# Patient Record
Sex: FEMALE | Race: WHITE | Employment: FULL TIME | URBAN - METROPOLITAN AREA
[De-identification: names, ages, dates, MRNs, and addresses within clinical notes are randomized per-mention and may not be internally consistent; named-entity substitution may affect disease eponyms.]

---

## 2017-05-30 ENCOUNTER — HOSPITAL ENCOUNTER (OUTPATIENT)
Dept: MAMMOGRAPHY | Facility: HOSPITAL | Age: 47
Discharge: HOME | End: 2017-05-30
Attending: INTERNAL MEDICINE
Payer: COMMERCIAL

## 2017-05-30 DIAGNOSIS — Z12.39 SCREENING BREAST EXAMINATION: ICD-10-CM

## 2017-05-30 PROCEDURE — 77067 SCR MAMMO BI INCL CAD: CPT | Performed by: INTERNAL MEDICINE

## 2017-05-30 PROCEDURE — 77063 BREAST TOMOSYNTHESIS BI: CPT | Performed by: INTERNAL MEDICINE

## 2018-09-22 PROBLEM — R10.9 RIGHT FLANK PAIN: Status: ACTIVE | Noted: 2018-09-22

## 2018-09-24 ENCOUNTER — HOSPITAL ENCOUNTER (OUTPATIENT)
Dept: CT IMAGING | Facility: HOSPITAL | Age: 48
Discharge: HOME | End: 2018-09-24
Attending: INTERNAL MEDICINE
Payer: COMMERCIAL

## 2018-09-24 DIAGNOSIS — R10.31 ABDOMINAL PAIN, RIGHT LOWER QUADRANT: ICD-10-CM

## 2018-09-24 PROCEDURE — 82565 ASSAY OF CREATININE: CPT

## 2018-09-24 PROCEDURE — 74177 CT ABD & PELVIS W/CONTRAST: CPT | Performed by: INTERNAL MEDICINE

## 2018-09-25 LAB — CREAT SERPL-MCNC: 0.7 MG/DL (ref 0.55–1.02)

## 2018-10-06 ENCOUNTER — HOSPITAL ENCOUNTER (OUTPATIENT)
Dept: MAMMOGRAPHY | Age: 48
Discharge: HOME | End: 2018-10-06
Attending: INTERNAL MEDICINE
Payer: COMMERCIAL

## 2018-10-06 DIAGNOSIS — Z12.31 VISIT FOR SCREENING MAMMOGRAM: ICD-10-CM

## 2018-10-06 PROCEDURE — 77063 BREAST TOMOSYNTHESIS BI: CPT | Performed by: INTERNAL MEDICINE

## 2018-10-06 PROCEDURE — 77067 SCR MAMMO BI INCL CAD: CPT | Performed by: INTERNAL MEDICINE

## 2018-10-18 ENCOUNTER — HOSPITAL ENCOUNTER (OUTPATIENT)
Dept: MAMMOGRAPHY | Facility: HOSPITAL | Age: 48
Discharge: HOME | End: 2018-10-18
Attending: INTERNAL MEDICINE
Payer: COMMERCIAL

## 2018-10-18 DIAGNOSIS — R92.2 INCONCLUSIVE MAMMOGRAM: ICD-10-CM

## 2018-10-18 PROCEDURE — 77061 BREAST TOMOSYNTHESIS UNI: CPT | Performed by: INTERNAL MEDICINE

## 2018-10-18 PROCEDURE — 77065 DX MAMMO INCL CAD UNI: CPT | Performed by: INTERNAL MEDICINE

## 2018-11-07 ENCOUNTER — OFFICE VISIT (OUTPATIENT)
Dept: FAMILY MEDICINE CLINIC | Facility: CLINIC | Age: 48
End: 2018-11-07
Payer: COMMERCIAL

## 2018-11-07 VITALS
SYSTOLIC BLOOD PRESSURE: 138 MMHG | HEART RATE: 72 BPM | TEMPERATURE: 98 F | RESPIRATION RATE: 20 BRPM | HEIGHT: 60 IN | DIASTOLIC BLOOD PRESSURE: 86 MMHG | BODY MASS INDEX: 37.3 KG/M2 | OXYGEN SATURATION: 98 % | WEIGHT: 190 LBS

## 2018-11-07 DIAGNOSIS — L01.00 IMPETIGO: Primary | ICD-10-CM

## 2018-11-07 PROCEDURE — 99213 OFFICE O/P EST LOW 20 MIN: CPT | Performed by: NURSE PRACTITIONER

## 2018-11-07 RX ORDER — MUPIROCIN CALCIUM 20 MG/G
1 CREAM TOPICAL 3 TIMES DAILY
Qty: 15 G | Refills: 1 | Status: SHIPPED | OUTPATIENT
Start: 2018-11-07 | End: 2018-11-21

## 2018-11-07 NOTE — PATIENT INSTRUCTIONS
Understanding Impetigo  Impetigo is a common bacterial infection of the skin. It most often affects the face, arms, and legs. But it can appear on any part of the body. Anyone can have it, regardless of age. But it is most common in children.  Impetigo is · To prevent spreading the infection, wash your hands often. Avoid sharing personal items, towels, clothes, pillows, and sheets with others. After each use, wash these items in hot water. · Clean the affected skin several times a day. Don’t scrub.  Instead Many types of bacteria live on normal, healthy skin. The bacteria usually don’t cause problems. Impetigo happens when bacteria enter the skin through a scratch, break, sore, bite, or irritated spot.  They then begin to grow out of control, leading to infect · Clean the affected skin several times a day. Don’t scrub. Instead, soak the area in warm, soapy water. This will help remove the crust that forms.  For places that you can't soak, such as the face, place a clean, warm (not hot) washcloth on the affected a

## 2018-11-07 NOTE — PROGRESS NOTES
CHIEF COMPLAINT:     Patient presents with:  Derm Problem      HPI:   Bing Yu is a 53year old female who presents with complaints of redness ans yellowish discharge to lateral right index finger.   Had an abrasion and redness with slight yellowish EYES: conjunctiva clear, EOM intact, PERRLA  EARS: TM's Pearly grey bilaterally. NOSE: nostrils patent, No exudates, nasal mucosa pink and noninflamed  THROAT: oral mucosa pink, moist. No visible dental caries. Posterior pharynx is nonerythematous.  No exu The goal is to treat the infection and prevent it from spreading to others. · You will likely be given an antibiotic to treat the infection. This may be a cream or ointment called muporicin to put on your skin.  If the infection is severe or spreading, you Impetigo is a common bacterial infection of the skin. It most often affects the face, arms, and legs. But it can appear on any part of the body. Anyone can have it, regardless of age. But it is most common in children. Impetigo is very contagious.  This alvarez · To prevent spreading the infection, wash your hands often. Avoid sharing personal items, towels, clothes, pillows, and sheets with others. After each use, wash these items in hot water. · Clean the affected skin several times a day. Don’t scrub.  Instead

## 2019-04-01 ENCOUNTER — HOSPITAL ENCOUNTER (OUTPATIENT)
Dept: MAMMOGRAPHY | Facility: HOSPITAL | Age: 49
Discharge: HOME | End: 2019-04-01
Attending: INTERNAL MEDICINE
Payer: COMMERCIAL

## 2019-04-01 DIAGNOSIS — R92.8 ABNORMAL MAMMOGRAM: ICD-10-CM

## 2019-04-01 PROCEDURE — 77065 DX MAMMO INCL CAD UNI: CPT | Performed by: INTERNAL MEDICINE

## 2019-04-01 PROCEDURE — 77061 BREAST TOMOSYNTHESIS UNI: CPT | Performed by: INTERNAL MEDICINE

## 2019-04-01 NOTE — IMAGING NOTE
Assisted Dr. Margie Arevalo with recommendation for a left stereotactic-guided breast biopsy for calcifications. Emotional and educational support provided. Written information provided to Brandy Hall.  Pt instructed to stop all blood thinners for 3 days bef

## 2019-04-10 ENCOUNTER — HOSPITAL ENCOUNTER (EMERGENCY)
Facility: HOSPITAL | Age: 49
Discharge: HOME | End: 2019-04-10
Attending: EMERGENCY MEDICINE
Payer: COMMERCIAL

## 2019-04-10 ENCOUNTER — APPOINTMENT (OUTPATIENT)
Dept: GENERAL RADIOLOGY | Facility: HOSPITAL | Age: 49
End: 2019-04-10
Attending: EMERGENCY MEDICINE
Payer: COMMERCIAL

## 2019-04-10 ENCOUNTER — APPOINTMENT (OUTPATIENT)
Dept: CT IMAGING | Facility: HOSPITAL | Age: 49
End: 2019-04-10
Attending: EMERGENCY MEDICINE
Payer: COMMERCIAL

## 2019-04-10 VITALS
HEART RATE: 69 BPM | SYSTOLIC BLOOD PRESSURE: 139 MMHG | RESPIRATION RATE: 14 BRPM | WEIGHT: 184 LBS | HEIGHT: 60 IN | BODY MASS INDEX: 36.12 KG/M2 | TEMPERATURE: 99 F | DIASTOLIC BLOOD PRESSURE: 94 MMHG | OXYGEN SATURATION: 99 %

## 2019-04-10 DIAGNOSIS — R07.89 CHEST WALL PAIN: Primary | ICD-10-CM

## 2019-04-10 PROCEDURE — 99285 EMERGENCY DEPT VISIT HI MDM: CPT

## 2019-04-10 PROCEDURE — 84484 ASSAY OF TROPONIN QUANT: CPT

## 2019-04-10 PROCEDURE — 84484 ASSAY OF TROPONIN QUANT: CPT | Performed by: EMERGENCY MEDICINE

## 2019-04-10 PROCEDURE — 93010 ELECTROCARDIOGRAM REPORT: CPT

## 2019-04-10 PROCEDURE — 85025 COMPLETE CBC W/AUTO DIFF WBC: CPT | Performed by: EMERGENCY MEDICINE

## 2019-04-10 PROCEDURE — 36415 COLL VENOUS BLD VENIPUNCTURE: CPT

## 2019-04-10 PROCEDURE — 71275 CT ANGIOGRAPHY CHEST: CPT | Performed by: EMERGENCY MEDICINE

## 2019-04-10 PROCEDURE — 81025 URINE PREGNANCY TEST: CPT

## 2019-04-10 PROCEDURE — 85025 COMPLETE CBC W/AUTO DIFF WBC: CPT

## 2019-04-10 PROCEDURE — 85378 FIBRIN DEGRADE SEMIQUANT: CPT | Performed by: EMERGENCY MEDICINE

## 2019-04-10 PROCEDURE — 80053 COMPREHEN METABOLIC PANEL: CPT

## 2019-04-10 PROCEDURE — 71045 X-RAY EXAM CHEST 1 VIEW: CPT | Performed by: EMERGENCY MEDICINE

## 2019-04-10 PROCEDURE — 80053 COMPREHEN METABOLIC PANEL: CPT | Performed by: EMERGENCY MEDICINE

## 2019-04-10 PROCEDURE — 93005 ELECTROCARDIOGRAM TRACING: CPT

## 2019-04-10 NOTE — ED INITIAL ASSESSMENT (HPI)
Pt here with chest pain which started last night. States she has history of \"murmur\". Has seen primary care for right lower abdominal pain which was worked up weeks ago, but is still present and very severe at times.

## 2019-04-10 NOTE — ED PROVIDER NOTES
Patient Seen in: [facility] Emergency Department    History   Patient presents with:  Chest Pain Angina (cardiovascular)    Stated Complaint: chest pain    HPI    50-year-old male here concerned about chest pain that she has had on the left side.   I regular rhythm. Pulmonary/Chest: Effort normal and breath sounds normal. No stridor. Abdominal: Soft. There is no tenderness. There is no guarding. Musculoskeletal: Exhibits no edema or tenderness.    Neurological: Pt is alert and oriented to person, results for these tests on the individual orders. POCT PREGNANCY, URINE   RAINBOW DRAW BLUE   RAINBOW DRAW LAVENDER   RAINBOW DRAW LIGHT GREEN   RAINBOW DRAW GOLD   CBC W/ DIFFERENTIAL     EKG    Rate, intervals and axes as noted on EKG Report.   Rate: 73 at 10:22     Approved by: Mari Werner MD                MDM   Negative troponin, reassuring EKG, negative CTA chest.  Given findings today, doubt serious etiology of her pain. Continues to to Medicare, follow-up with PCP.   Return precautions discussed

## 2019-04-22 ENCOUNTER — HOSPITAL ENCOUNTER (OUTPATIENT)
Dept: MAMMOGRAPHY | Facility: HOSPITAL | Age: 49
Discharge: HOME | End: 2019-04-22
Attending: INTERNAL MEDICINE
Payer: COMMERCIAL

## 2019-04-22 DIAGNOSIS — R92.8 ABNORMAL MAMMOGRAM: ICD-10-CM

## 2019-04-22 PROCEDURE — 88305 TISSUE EXAM BY PATHOLOGIST: CPT | Performed by: INTERNAL MEDICINE

## 2019-04-22 PROCEDURE — 19081 BX BREAST 1ST LESION STRTCTC: CPT | Performed by: INTERNAL MEDICINE

## 2019-04-24 ENCOUNTER — TELEPHONE (OUTPATIENT)
Dept: MAMMOGRAPHY | Facility: HOSPITAL | Age: 49
End: 2019-04-24

## 2019-04-24 NOTE — TELEPHONE ENCOUNTER
Verified pt's name and . Spoke with patient regarding preliminary negative breast biopsy results. Biopsy site is healing well. Hematoma management discussed.   Radiologist recommendation is to get a Diagnostic mammogram in 6 months to document stabilit

## 2019-11-07 ENCOUNTER — HOSPITAL ENCOUNTER (OUTPATIENT)
Dept: MAMMOGRAPHY | Facility: HOSPITAL | Age: 49
Discharge: HOME | End: 2019-11-07
Attending: INTERNAL MEDICINE
Payer: COMMERCIAL

## 2019-11-07 DIAGNOSIS — R92.8 ABNORMAL MAMMOGRAM: ICD-10-CM

## 2019-11-07 PROCEDURE — 77061 BREAST TOMOSYNTHESIS UNI: CPT | Performed by: INTERNAL MEDICINE

## 2019-11-07 PROCEDURE — 77065 DX MAMMO INCL CAD UNI: CPT | Performed by: INTERNAL MEDICINE

## 2020-11-04 ENCOUNTER — HOSPITAL ENCOUNTER (OUTPATIENT)
Dept: MAMMOGRAPHY | Age: 50
Discharge: HOME | End: 2020-11-04
Attending: INTERNAL MEDICINE
Payer: COMMERCIAL

## 2020-11-04 DIAGNOSIS — Z00.00 ROUTINE GENERAL MEDICAL EXAMINATION AT A HEALTH CARE FACILITY: ICD-10-CM

## 2020-11-04 DIAGNOSIS — Z12.31 ENCOUNTER FOR SCREENING MAMMOGRAM FOR MALIGNANT NEOPLASM OF BREAST: ICD-10-CM

## 2020-11-04 PROCEDURE — 77067 SCR MAMMO BI INCL CAD: CPT | Performed by: INTERNAL MEDICINE

## 2020-11-04 PROCEDURE — 77063 BREAST TOMOSYNTHESIS BI: CPT | Performed by: INTERNAL MEDICINE

## 2020-11-13 ENCOUNTER — HOSPITAL ENCOUNTER (OUTPATIENT)
Dept: MAMMOGRAPHY | Facility: HOSPITAL | Age: 50
Discharge: HOME | End: 2020-11-13
Attending: INTERNAL MEDICINE
Payer: COMMERCIAL

## 2020-11-13 DIAGNOSIS — R92.2 INCONCLUSIVE MAMMOGRAM: ICD-10-CM

## 2020-11-13 PROCEDURE — 76641 ULTRASOUND BREAST COMPLETE: CPT | Performed by: INTERNAL MEDICINE

## 2021-11-08 ENCOUNTER — HOSPITAL ENCOUNTER (OUTPATIENT)
Dept: MAMMOGRAPHY | Age: 51
Discharge: HOME | End: 2021-11-08
Attending: INTERNAL MEDICINE
Payer: COMMERCIAL

## 2021-11-08 DIAGNOSIS — Z12.31 ENCOUNTER FOR SCREENING MAMMOGRAM FOR MALIGNANT NEOPLASM OF BREAST: ICD-10-CM

## 2021-12-10 ENCOUNTER — HOSPITAL ENCOUNTER (OUTPATIENT)
Dept: MAMMOGRAPHY | Age: 51
Discharge: HOME | End: 2021-12-10
Attending: INTERNAL MEDICINE
Payer: COMMERCIAL

## 2021-12-10 DIAGNOSIS — Z12.31 ENCOUNTER FOR SCREENING MAMMOGRAM FOR MALIGNANT NEOPLASM OF BREAST: ICD-10-CM

## 2021-12-10 PROCEDURE — 77067 SCR MAMMO BI INCL CAD: CPT | Performed by: INTERNAL MEDICINE

## 2021-12-10 PROCEDURE — 77063 BREAST TOMOSYNTHESIS BI: CPT | Performed by: INTERNAL MEDICINE

## 2024-01-17 ENCOUNTER — HOSPITAL ENCOUNTER (OUTPATIENT)
Dept: GENERAL RADIOLOGY | Age: 54
Discharge: HOME | End: 2024-01-17
Attending: INTERNAL MEDICINE
Payer: COMMERCIAL

## 2024-01-17 DIAGNOSIS — M25.561 RIGHT KNEE PAIN, UNSPECIFIED CHRONICITY: ICD-10-CM

## 2024-01-17 PROCEDURE — 73562 X-RAY EXAM OF KNEE 3: CPT | Performed by: INTERNAL MEDICINE

## 2024-01-29 ENCOUNTER — HOSPITAL ENCOUNTER (OUTPATIENT)
Dept: MAMMOGRAPHY | Age: 54
Discharge: HOME | End: 2024-01-29
Attending: INTERNAL MEDICINE
Payer: COMMERCIAL

## 2024-01-29 DIAGNOSIS — Z12.31 VISIT FOR SCREENING MAMMOGRAM: ICD-10-CM

## 2024-01-29 PROCEDURE — 77063 BREAST TOMOSYNTHESIS BI: CPT | Performed by: INTERNAL MEDICINE

## 2024-01-29 PROCEDURE — 77067 SCR MAMMO BI INCL CAD: CPT | Performed by: INTERNAL MEDICINE

## 2024-02-20 ENCOUNTER — HOSPITAL ENCOUNTER (OUTPATIENT)
Dept: GENERAL RADIOLOGY | Age: 54
Discharge: HOME | End: 2024-02-20
Payer: COMMERCIAL

## 2024-02-20 ENCOUNTER — LAB ENCOUNTER (OUTPATIENT)
Dept: LAB | Age: 54
End: 2024-02-20
Payer: COMMERCIAL

## 2024-02-20 ENCOUNTER — OFFICE VISIT (OUTPATIENT)
Dept: RHEUMATOLOGY | Facility: CLINIC | Age: 54
End: 2024-02-20
Payer: COMMERCIAL

## 2024-02-20 VITALS — WEIGHT: 213 LBS | BODY MASS INDEX: 41.82 KG/M2 | HEIGHT: 60 IN

## 2024-02-20 DIAGNOSIS — M25.50 POLYARTHRALGIA: ICD-10-CM

## 2024-02-20 DIAGNOSIS — R76.8 ANA POSITIVE: ICD-10-CM

## 2024-02-20 DIAGNOSIS — R76.8 ANA POSITIVE: Primary | ICD-10-CM

## 2024-02-20 DIAGNOSIS — M17.0 OSTEOARTHRITIS OF BOTH KNEES, UNSPECIFIED OSTEOARTHRITIS TYPE: ICD-10-CM

## 2024-02-20 LAB
C3 SERPL-MCNC: 173.3 MG/DL (ref 90–170)
C4 SERPL-MCNC: 37.8 MG/DL (ref 12–36)
CRP SERPL-MCNC: 1.9 MG/DL (ref ?–1)
ERYTHROCYTE [SEDIMENTATION RATE] IN BLOOD: 47 MM/HR
HAV IGM SER QL: NONREACTIVE
HBV CORE IGM SER QL: NONREACTIVE
HBV SURFACE AG SERPL QL IA: NONREACTIVE
HCV AB SERPL QL IA: NONREACTIVE

## 2024-02-20 PROCEDURE — 86235 NUCLEAR ANTIGEN ANTIBODY: CPT | Performed by: INTERNAL MEDICINE

## 2024-02-20 PROCEDURE — 86225 DNA ANTIBODY NATIVE: CPT

## 2024-02-20 PROCEDURE — 86039 ANTINUCLEAR ANTIBODIES (ANA): CPT | Performed by: INTERNAL MEDICINE

## 2024-02-20 PROCEDURE — 72110 X-RAY EXAM L-2 SPINE 4/>VWS: CPT | Performed by: INTERNAL MEDICINE

## 2024-02-20 PROCEDURE — 72202 X-RAY EXAM SI JOINTS 3/> VWS: CPT | Performed by: INTERNAL MEDICINE

## 2024-02-20 PROCEDURE — 86480 TB TEST CELL IMMUN MEASURE: CPT

## 2024-02-20 PROCEDURE — 73562 X-RAY EXAM OF KNEE 3: CPT | Performed by: INTERNAL MEDICINE

## 2024-02-20 PROCEDURE — 36415 COLL VENOUS BLD VENIPUNCTURE: CPT

## 2024-02-20 PROCEDURE — 73130 X-RAY EXAM OF HAND: CPT | Performed by: INTERNAL MEDICINE

## 2024-02-20 PROCEDURE — 3008F BODY MASS INDEX DOCD: CPT | Performed by: INTERNAL MEDICINE

## 2024-02-20 PROCEDURE — 80074 ACUTE HEPATITIS PANEL: CPT

## 2024-02-20 PROCEDURE — 86160 COMPLEMENT ANTIGEN: CPT

## 2024-02-20 PROCEDURE — 20610 DRAIN/INJ JOINT/BURSA W/O US: CPT | Performed by: INTERNAL MEDICINE

## 2024-02-20 PROCEDURE — 85652 RBC SED RATE AUTOMATED: CPT

## 2024-02-20 PROCEDURE — 99245 OFF/OP CONSLTJ NEW/EST HI 55: CPT | Performed by: INTERNAL MEDICINE

## 2024-02-20 PROCEDURE — 86038 ANTINUCLEAR ANTIBODIES: CPT | Performed by: INTERNAL MEDICINE

## 2024-02-20 PROCEDURE — 86140 C-REACTIVE PROTEIN: CPT

## 2024-02-20 PROCEDURE — 86225 DNA ANTIBODY NATIVE: CPT | Performed by: INTERNAL MEDICINE

## 2024-02-20 RX ORDER — TRIAMCINOLONE ACETONIDE 40 MG/ML
40 INJECTION, SUSPENSION INTRA-ARTICULAR; INTRAMUSCULAR ONCE
Status: COMPLETED | OUTPATIENT
Start: 2024-02-20 | End: 2024-02-20

## 2024-02-21 LAB — DSDNA IGG SERPL IA-ACNC: 2.4 IU/ML

## 2024-02-22 LAB
M TB IFN-G CD4+ T-CELLS BLD-ACNC: 0 IU/ML
M TB TUBERC IFN-G BLD QL: NEGATIVE
M TB TUBERC IGNF/MITOGEN IGNF CONTROL: >10 IU/ML
NUCLEAR IGG TITR SER IF: POSITIVE {TITER}
QFT TB1 AG MINUS NIL: 0 IU/ML
QFT TB2 AG MINUS NIL: 0 IU/ML

## 2024-02-23 LAB
ANA NUCLEOLAR TITR SER IF: 5120 {TITER}
DSDNA AB TITR SER: <10 {TITER}

## 2024-02-24 LAB
CENTROMERE IGG SER-ACNC: <0.4 U/ML
ENA JO1 AB SER IA-ACNC: <0.3 U/ML
ENA RNP IGG SER IA-ACNC: 0.9 U/ML
ENA SCL70 IGG SER IA-ACNC: <0.6 U/ML
ENA SM IGG SER IA-ACNC: <0.7 U/ML
ENA SS-A IGG SER IA-ACNC: <0.4 U/ML
ENA SS-B IGG SER IA-ACNC: <0.4 U/ML
U1 SNRNP IGG SER IA-ACNC: 0.8 U/ML

## 2024-03-13 ENCOUNTER — PATIENT MESSAGE (OUTPATIENT)
Dept: RHEUMATOLOGY | Facility: CLINIC | Age: 54
End: 2024-03-13

## 2024-03-13 NOTE — TELEPHONE ENCOUNTER
From: Ny Camacho  To: Abdelrahman Nevarez  Sent: 3/13/2024 5:45 AM CDT  Subject: Pain    Hi Dr. Nevarez     I got some relief from the cortisone shots, but the pain is back and affecting my walk. Even muscle discomfort from me adjusting my gait because of knee pain. I know our next appointment is in a couple weeks, just wanted to let you know that any relief I was given by the shots has worn off.    Thank you,  Ny

## 2024-03-18 ENCOUNTER — TELEPHONE (OUTPATIENT)
Dept: RHEUMATOLOGY | Facility: CLINIC | Age: 54
End: 2024-03-18

## 2024-03-18 RX ORDER — PREDNISONE 5 MG/1
TABLET ORAL
Qty: 77 TABLET | Refills: 0 | Status: SHIPPED | OUTPATIENT
Start: 2024-03-18 | End: 2024-05-15

## 2024-03-20 NOTE — TELEPHONE ENCOUNTER
Hello-that is great news.  Perhaps, we can do the Synvisc injection during her appointment with me on 3/26.

## 2024-03-20 NOTE — TELEPHONE ENCOUNTER
03/20/2024  07:15 AM  BV result  Posted by: Summer    Complete BV    NO Action required: Synvisc-one is eligible under primary medical benefit.Covered @ 100% after Co-pay under Medical.Medication Can be obtained via physician Buy and Bill.Pre-authorization is not required.Please review the attached Benefit Verification Results form for more details.MT*O

## 2024-03-26 ENCOUNTER — OFFICE VISIT (OUTPATIENT)
Dept: RHEUMATOLOGY | Facility: CLINIC | Age: 54
End: 2024-03-26

## 2024-03-26 VITALS — BODY MASS INDEX: 42 KG/M2 | HEIGHT: 60 IN

## 2024-03-26 DIAGNOSIS — Z79.899 ENCOUNTER FOR LONG-TERM (CURRENT) USE OF HIGH-RISK MEDICATION: ICD-10-CM

## 2024-03-26 DIAGNOSIS — M06.00 SERONEGATIVE RHEUMATOID ARTHRITIS (HCC): Primary | ICD-10-CM

## 2024-03-26 DIAGNOSIS — M17.0 OSTEOARTHRITIS OF BOTH KNEES, UNSPECIFIED OSTEOARTHRITIS TYPE: ICD-10-CM

## 2024-03-26 DIAGNOSIS — R76.8 ANA POSITIVE: ICD-10-CM

## 2024-03-26 PROCEDURE — 99215 OFFICE O/P EST HI 40 MIN: CPT | Performed by: INTERNAL MEDICINE

## 2024-03-26 PROCEDURE — 20610 DRAIN/INJ JOINT/BURSA W/O US: CPT | Performed by: INTERNAL MEDICINE

## 2024-03-26 RX ORDER — HYDROXYCHLOROQUINE SULFATE 200 MG/1
400 TABLET, FILM COATED ORAL DAILY
Qty: 180 TABLET | Refills: 2 | Status: SHIPPED | OUTPATIENT
Start: 2024-03-26

## 2024-03-26 NOTE — PROGRESS NOTES
RHEUMATOLOGY CLINIC- FOLLOW UP    Ny Camacho is a 53 year old female.    ASSESSMENT/PLAN:       ICD-10-CM    1. Seronegative rheumatoid arthritis (HCC)  M06.00 hylan G-F 20 (Synvisc-One) 48 MG/6ML intra-articular injection 48 mg     hylan G-F 20 (Synvisc-One) 48 MG/6ML intra-articular injection 48 mg     CBC With Differential With Platelet     C-Reactive Protein     Sed Rate, Westergren (Automated)     ALT(SGPT)     AST (SGOT)     Creatinine, Serum      2. MISTY positive  R76.8 CBC With Differential With Platelet     C-Reactive Protein     Sed Rate, Westergren (Automated)     ALT(SGPT)     AST (SGOT)     Creatinine, Serum    2/20/24: MISTY 1: 5120 homogenous with reflex antibodies negative,dsDNA by IFA <10, dsDNA 2.4 WNL, C4 37.8, C3 173.3 , ESR 47 , CRP 1.9      3. Osteoarthritis of both knees, unspecified osteoarthritis type  M17.0 hylan G-F 20 (Synvisc-One) 48 MG/6ML intra-articular injection 48 mg     hylan G-F 20 (Synvisc-One) 48 MG/6ML intra-articular injection 48 mg     DRAIN/INJECT LARGE JOINT/BURSA     DRAIN/INJECT LARGE JOINT/BURSA      4. Encounter for long-term (current) use of high-risk medication  Z79.899 CBC With Differential With Platelet     C-Reactive Protein     Sed Rate, Westergren (Automated)     ALT(SGPT)     AST (SGOT)     Creatinine, Serum          DISCUSSION:  Patient presents for follow-up after originally presenting as a new referral for evaluation of polyarthralgias in setting of significantly positive MISTY and elevated inflammatory markers.  She noted significant response on a p.o. prednisone taper.  Overall, patient's presentation is consistent with an underlying inflammatory throughout the; specifically, seronegative rheumatoid arthritis.  Therefore, discussed with patient risk/benefits of hydroxychloroquine to which patient is agreeable.  Additionally, given only minimal improvement from bilateral knee CSI's, Synvisc injections performed in office today without  complication.      PLAN:  -Start hydroxychloroquine 400 mg daily (less than 5 mg/kg)       -Discussed with patient side effects of Plaquenil which include but are not limited to retinopathy skin changes, blood abnormalities, hepatotoxicity, cardiotoxicity, neuro changes such as dizziness, fatigue, irritability, myopathy.  Pending patient response, patient will require annual Plaquenil eye exams if continued on chronic Plaquenil.        -Patient has an upcoming visit with her ophthalmologist.  Discussed with patient to let her ophthalmologist know about her treatment with hydroxychloroquine.  - Patient currently on p.o. prednisone 10 mg daily from the 20 mg taper.  Discussed with patient, after completing the taper to let our office know and we will send a prescription for p.o. prednisone 5 mg as needed until follow-up.  - Consult/evaluation communicated with referring physician/provider.    PROCEDURE: Bilateral Knee Synvisc injections   INDICATION: Bilateral Knee Pain with Bilateral Knee Osteoarthritis     Procedure:After sterile prep with Povidone-Iodine and alcohol and following ethyl chloride spray the LEFT knee was injected with Xylocaine 1% x 1 cc in the medial midpatellar line under sterile technique. Then knee was then injected with the contents of a Synvisc syringe followed by 1 cc of Xylocaine 1%. The needle was withdrawn and hemostasis was achieved with pressure. Band-Aid dressings were applied. Pt tolerated the procedure well.      After sterile prep with Povidone-Iodine and alcohol and following ethyl chloride spray the RIGHT knee was injected with Xylocaine 1% x 1 cc in the medial midpatellar line under sterile technique. Then knee was then injected with the contents of a Synvisc syringe followed by 1 cc of Xylocaine 1%. The needle was withdrawn and hemostasis was achieved with pressure. Band-Aid dressings were applied. Pt tolerated the procedure well.      Patient was instructed to limit ambulation for  the remainder of the day and apply ice to the joint 30 minutes per hour for the next 3 hours and then as needed thereafter. Pt. will call the office if there is increased warmth tenderness swelling bleeding bruising or drainage. Otherwise will return for next injection in 1 week/ will be eligible for repeat series in 6 months.        Abdelrahman Nevarez DO  3/26/2024   10:41 AM      Patient to follow-up in 3 months with repeat labs drawn prior.    Patient has 1 acute or chronic illness that poses a threat to life or bodily function.  I performed an independent interpretation of tests completed by another healthcare professional and am discussing management and test interpretation with and external healthcare professional.    Discussed with patient that they are on chronic treatment with a high-risk medication that requires close lab monitoring for possible drug toxicity.  Additionally, discussed with patient give the possible immunosuppressive effects of their medication as well as their underlying hyper-inflammatory state, the importance of keeping vaccinations and age-appropriate screenings up-to-date, given increased risk of complications and malignancies seen in these patient populations.  Patient to f/u with repeat labs drawn prior (standing labs ordered).  Last QuantiFERON TB testin24  Last Hepatitis testin24         Subjective:     3/26/24 Follow-Up: Patient feeling significantly improved since starting the p.o. prednisone taper, now able to complete her ADLs and was even gardening yesterday.  She is currently on 10 mg daily.  No longer taking Celebrex.  Noted minimal improvement with her previous bilateral knee CSI's that only lasted for about 10 days.      Current Medications:  PO 10 mg every day- starting at a  PO Prednisone taper 20 mg every day on 24    Medication History:  Patient has not been on biologic/DMARD  Bilateral knee CSI's over 10 years ago-ineffective  Bilateral Knee CSI  2/20/24  Celebrex 200 mg every day- no improvement    Interval History:     2/20/24 Initial Consult:   I had the pleasure of seeing Ny Camacho on 2/20/2024 as a new outpatient consultation for positive MISTY. The patient was originally referred by her PCP, Dr Wil Reese.     53 year old female w/ PMH HTN, chondromalacia of the patella, impetigo who presents to clinic today.  Patient reports polyarthralgias affecting her hips, low back, hands, and knees.  Predominantly, however, patient especially notes significant knee pain that can interrupt her sleep described as a \"dull, aching \"pain.  All of her arthralgias are worse at the end of the day and worse with activity such as weightbearing activities like climbing the stairs.  No significant a.m. stiffness.  She has been attempting Celebrex 200 mg daily without much improvement of her symptoms.  ROS otherwise negative for fever, chills, unintentional weight loss, Raynaud's phenomenon, sicca symptoms, photosensitivity/rash, changes to urination such as hematuria/frothiness.  Family history notable for a mother with debilitating arthritis, however, unknown whether inflammatory versus degenerative.  HISTORY:  Past Medical History:   Diagnosis Date    Essential hypertension     Obesity, unspecified 2012    lap band    Painful knee approx date 2011    left knee pain no cartilage bones rubbing together      Social Hx Reviewed   Family Hx Reviewed     Medications (Active prior to today's visit):  Current Outpatient Medications   Medication Sig Dispense Refill    predniSONE 5 MG Oral Tab Take 4 tablets (20 mg total) by mouth daily for 3 days, THEN 3 tablets (15 mg total) daily for 3 days, THEN 2 tablets (10 mg total) daily for 3 days, THEN 1 tablet (5 mg total) daily. Take 4 tablets (20 mg total) by mouth every morning with food for 3 days, THEN 3 tablets (15 mg total) every morning with food for 3 days, THEN 2 tablets (10 mg total) every morning with food for 3 days.  Then, continue 1 tablet every morning with food (5 mg total) until follow-up.. 77 tablet 0    Multiple Vitamins-Minerals (MULTIPLE VITAMINS/WOMENS OR) Take by mouth.      celecoxib (CELEBREX) 200 MG Oral Cap Take 1 capsule (200 mg total) by mouth daily. (Patient not taking: Reported on 2/20/2024) 90 capsule 0    losartan-hydroCHLOROthiazide 100-25 MG Oral Tab Take 1 tablet by mouth daily. 90 tablet 0    Cholecalciferol (VITAMIN D-1000 MAX ST) 25 MCG (1000 UT) Oral Tab Take 1 tablet (1,000 Units total) by mouth daily.  0       Allergies:  Allergies   Allergen Reactions    Asa [Aspirin] SWELLING     Tongue and lip swelling    Other      Bee Sting childhood unsure of reaction    Penicillins SWELLING     Tongue and lip swelling         ROS:   Review of Systems   Constitutional:  Negative for chills and fever.   HENT:  Negative for congestion, hearing loss, mouth sores, nosebleeds and trouble swallowing.    Eyes:  Negative for photophobia, pain, redness and visual disturbance.   Respiratory:  Negative for cough and shortness of breath.    Cardiovascular:  Negative for chest pain, palpitations and leg swelling.   Gastrointestinal:  Negative for abdominal pain, blood in stool, diarrhea and nausea.   Endocrine: Negative for cold intolerance and heat intolerance.   Genitourinary:  Negative for dysuria, frequency and hematuria.   Musculoskeletal:  Positive for arthralgias, back pain and gait problem. Negative for joint swelling, myalgias, neck pain and neck stiffness.   Skin:  Negative for color change and rash.   Neurological:  Negative for dizziness, weakness, numbness and headaches.   Psychiatric/Behavioral:  Negative for confusion and dysphoric mood.         PHYSICAL EXAM:     Constitutional:  Well developed, Well nourished, No acute distress  HENT:  Normocephalic, Atraumatic, Bilateral external ears normal, Oropharynx moist, No oral exudates.  Neck: Normal range of motion, No tenderness, Supple, No stridor.  Eyes:   PERRL, EOMI, Conjunctiva normal, No discharge.  Respiratory:  Normal breath sounds, No respiratory distress, No wheezing.  Cardiovascular:  Normal heart rate, Normal rhythm, No murmurs, No rubs, No gallops.  GI:  Bowel sounds normal, Soft, No tenderness, No masses, No pulsatile masses.  : No CVA tenderness.  Musculoskeletal: A comprehensive 28 count joint exam was done and was negative for swelling or tenderness except as noted. Inspections for misalignment, asymmetry, crepitation, defects, tenderness, masses, nodules, effusions, range of motion, and stability in the upper and lower extremities bilaterally are all normal unless noted.           Integument:  Warm, Dry, No erythema, No rash.  Lymphatic:  No lymphadenopathy noted.  Neurologic:  Alert & oriented x 3, Normal motor function, Normal sensory function, No focal deficits noted.  Psychiatric:  Affect normal, Judgment normal, Mood normal.    LABS:   Prior lab work reviewed and notable for:    24:  MISTY 1: 5120 homogenous with reflex antibodies negative  dsDNA by IFA less than 10 WNL, dsDNA 2.4 WNL  C4 37.8 (high), C3 173.3 (high)  ESR 47 (high), CRP 1.9 (high)    TB testing and acute hepatitis panel negative    2024:  CMP with normal renal function, normal LFTs, no gamma gap  CBC without cytopenias and no leukocytosis  TSH 4.04 WNL    MISTY screen by IFA 1: 1280, nuclear/homogenous  RF less than 14, CCP less than 16  CRP 21.8 (high), ESR 33 (high)    Imagin2024 bilateral hand XR's:    L Hand Impression   CONCLUSION:  1. No acute fracture or dislocation.  2. Moderate arthritic changes involve the 1st carpal-metacarpal joint.  Mild degeneration MCP and interphalangeal joints, with marked degeneration at the DIP joints 3rd and 4th digits.            R Hand Impression   CONCLUSION:  1. No acute fracture or dislocation.  2. Osteoarthritic changes involving the wrist and hand.     2024 left knee XR:    Impression   CONCLUSION:  1. No acute  fracture or dislocation.  2. Moderate tricompartment osteoarthritis.  Mild joint effusion.       2/20/2024 lumbar spine XR:  Impression   CONCLUSION:  1. Chronic anterior spondylolisthesis L4 relative to L5.  2. S-shaped scoliosis and multilevel spondylosis.  3. No acute fracture or acute malalignment.       2/20/2024 SI joint XR:  Impression   CONCLUSION:  1. No acute fracture dislocation.  2. Mild bilateral SI joint degenerative changes, left greater than right.          1/17/2024 right knee XR:    Impression   CONCLUSION:       Negative for fracture or malalignment.  Moderate joint space loss of the medial compartment with tricompartmental osteophyte formation.  No patellar subluxation.  No joint effusion or focal soft tissue swelling.

## 2024-04-09 ENCOUNTER — TELEPHONE (OUTPATIENT)
Dept: RHEUMATOLOGY | Facility: CLINIC | Age: 54
End: 2024-04-09

## 2024-04-09 NOTE — TELEPHONE ENCOUNTER
Returned patient's call 4/9 during the afternoon.  Patient started hydroxychloroquine 400 mg about 1 week prior.  However, started noting GI upset a week after.  Additionally, her GI symptoms just started recently about 12 hours after her morning doses of hydroxychloroquine.  I suspect her GI upset is separate from the hydroxychloroquine, however, discussed with patient to divide up her hydroxychloroquine doses to help increase tolerability in case.  Patient to monitor symptoms for now.

## 2024-04-09 NOTE — TELEPHONE ENCOUNTER
Patient has recently started taking hydroxychloroquine. Patient is taking medication with food as instructed by Dr. Nevarez. Patient is extremely nauseas. Taking medication with food is not helping. Patient takes medication at 5am and starts getting sick 12-14 hours later. Patient is also experiencing diarrhea.   Patient is not sure if her symptoms are normal side effects of medication or if she is getting sick. Please advise

## 2024-05-17 RX ORDER — PREDNISONE 5 MG/1
5 TABLET ORAL DAILY PRN
Qty: 30 TABLET | Refills: 2 | Status: SHIPPED | OUTPATIENT
Start: 2024-05-17

## 2024-05-17 NOTE — TELEPHONE ENCOUNTER
Requested Prescriptions     Pending Prescriptions Disp Refills    PREDNISONE 5 MG Oral Tab [Pharmacy Med Name: PREDNISONE 5MG TABLETS] 77 tablet 0     Si TABLETS DAILY FOR 3 DAYS, 3 TABLETS DAILY FOR 3 DAYS, 2 TABLETS DAILY FOR 3 DAYS, THEN TAKE 1 TABLET DAILY UNTIL FOLLOW UP. TAKE WITH FOOD     Future Appointments   Date Time Provider Department Center   2024 10:00 AM Lehne, Ramier, DO [facility] [place]     LOV: 3/26/24   Last Refilled:3/18/24 #[id number]      PLAN:  -Start hydroxychloroquine 400 mg daily (less than 5 mg/kg)       -Discussed with patient side effects of Plaquenil which include but are not limited to retinopathy skin changes, blood abnormalities, hepatotoxicity, cardiotoxicity, neuro changes such as dizziness, fatigue, irritability, myopathy.  Pending patient response, patient will require annual Plaquenil eye exams if continued on chronic Plaquenil.        -Patient has an upcoming visit with her ophthalmologist.  Discussed with patient to let her ophthalmologist know about her treatment with hydroxychloroquine.  - Patient currently on p.o. prednisone 10 mg daily from the 20 mg taper.  Discussed with patient, after completing the taper to let our office know and we will send a prescription for p.o. prednisone 5 mg as needed until follow-up.  - Consult/evaluation communicated with referring physician/provider.

## 2024-06-28 ENCOUNTER — OFFICE VISIT (OUTPATIENT)
Dept: RHEUMATOLOGY | Facility: CLINIC | Age: 54
End: 2024-06-28

## 2024-06-28 VITALS
BODY MASS INDEX: 36.52 KG/M2 | DIASTOLIC BLOOD PRESSURE: 70 MMHG | HEART RATE: 72 BPM | SYSTOLIC BLOOD PRESSURE: 120 MMHG | HEIGHT: 60 IN | WEIGHT: 186 LBS

## 2024-06-28 DIAGNOSIS — M17.0 OSTEOARTHRITIS OF BOTH KNEES, UNSPECIFIED OSTEOARTHRITIS TYPE: ICD-10-CM

## 2024-06-28 DIAGNOSIS — M06.00 SERONEGATIVE RHEUMATOID ARTHRITIS (HCC): Primary | ICD-10-CM

## 2024-06-28 DIAGNOSIS — R76.8 ANA POSITIVE: ICD-10-CM

## 2024-06-28 DIAGNOSIS — Z79.899 ENCOUNTER FOR LONG-TERM (CURRENT) USE OF HIGH-RISK MEDICATION: ICD-10-CM

## 2024-06-28 PROCEDURE — G2211 COMPLEX E/M VISIT ADD ON: HCPCS | Performed by: INTERNAL MEDICINE

## 2024-06-28 PROCEDURE — 99214 OFFICE O/P EST MOD 30 MIN: CPT | Performed by: INTERNAL MEDICINE

## 2024-06-28 PROCEDURE — 3078F DIAST BP <80 MM HG: CPT | Performed by: INTERNAL MEDICINE

## 2024-06-28 PROCEDURE — 3074F SYST BP LT 130 MM HG: CPT | Performed by: INTERNAL MEDICINE

## 2024-06-28 PROCEDURE — 3008F BODY MASS INDEX DOCD: CPT | Performed by: INTERNAL MEDICINE

## 2024-06-28 RX ORDER — CELECOXIB 200 MG/1
200 CAPSULE ORAL 2 TIMES DAILY PRN
Qty: 90 CAPSULE | Refills: 2 | Status: SHIPPED | OUTPATIENT
Start: 2024-06-28 | End: 2024-06-28

## 2024-06-28 RX ORDER — GABAPENTIN 100 MG/1
CAPSULE ORAL
Qty: 90 CAPSULE | Refills: 1 | Status: SHIPPED | OUTPATIENT
Start: 2024-06-28

## 2024-06-28 RX ORDER — CELECOXIB 100 MG/1
100 CAPSULE ORAL 2 TIMES DAILY PRN
Qty: 60 CAPSULE | Refills: 2 | Status: SHIPPED | OUTPATIENT
Start: 2024-06-28

## 2024-06-28 NOTE — PROGRESS NOTES
RHEUMATOLOGY CLINIC- FOLLOW UP    Ny Camacho is a 53 year old female.    ASSESSMENT/PLAN:       ICD-10-CM    1. Seronegative rheumatoid arthritis (HCC)  M06.00       2. MISTY positive  R76.8     2/20/24: MISTY 1: 5120 homogenous with reflex antibodies negative,dsDNA by IFA <10, dsDNA 2.4 WNL, C4 37.8, C3 173.3 , ESR 47 , CRP 1.9      3. Osteoarthritis of both knees, unspecified osteoarthritis type  M17.0       4. Encounter for long-term (current) use of high-risk medication  Z79.899           DISCUSSION:  Patient presents for follow-up of seronegative rheumatoid arthritis (positive MISTY).  Overall, patient's presentation is consistent with an underlying inflammatory throughout the; specifically, seronegative rheumatoid arthritis.  Patient's symptoms significantly improved on hydroxychloroquine, therefore, we will continue at the same dose.  Okay for as needed Celebrex use and can add on a as needed prednisone for breakthrough pain if needed.  Patient is scheduled for a Plaquenil eye exam next week.  For her nocturnal ankle pain, will trial gabapentin.  If symptoms persist, could consider ankle CSI.      PLAN:  - Will trial gabapentin given nocturnal ankle pain.  Start with 100 mg before bedtime.  Discussed with patient if symptoms persist and no significant lethargy, confusion, malaise during the daytime, can increase to 300 mg of gabapentin before bedtime.       -If symptoms persist despite gabapentin, could consider ankle CSI's.  -Continue hydroxychloroquine 400 mg daily (less than 5 mg/kg)       -Discussed with patient side effects of Plaquenil which include but are not limited to retinopathy skin changes, blood abnormalities, hepatotoxicity, cardiotoxicity, neuro changes such as dizziness, fatigue, irritability, myopathy.  Pending patient response, patient will require annual Plaquenil eye exams if continued on chronic Plaquenil.  -PRN Celebrex 100 mg BID with meals   -PRN Prednisone 5 mg q AM for breakthrough  pain if Celebrex ineffective  - Consult/evaluation communicated with referring physician/provider.        -[facility] ophthalmology appointment next week.  Discussed with patient to have records of her Plaquenil eye exam faxed to our office.    Abdelrahman VelezclaraaustinDO  2024   10:36 AM  There is a longitudinal care relationship with me, the care plan reflects the ongoing nature of the continuous relationship of care, and the medical record indicates that there is ongoing treatment of a serious/complex medical condition which I am currently managing.  is Applicable.     Discussed with patient that they are on chronic treatment with a high-risk medication that requires close lab monitoring for possible drug toxicity.  Additionally, discussed with patient give the possible immunosuppressive effects of their medication as well as their underlying hyper-inflammatory state, the importance of keeping vaccinations and age-appropriate screenings up-to-date, given increased risk of complications and malignancies seen in these patient populations.  Patient to f/u with repeat labs drawn prior (standing labs ordered).  Last QuantiFERON TB testin24  Last Hepatitis testin24         Subjective:     24 Follow-Up: In the interim, since the patient's last appointment, I returned patient's call  during the afternoon.  Patient started hydroxychloroquine 400 mg about 1 week prior.  However, started noting GI upset a week after.  Additionally, her GI symptoms just started about 12 hours after her morning doses of hydroxychloroquine.  I suspected her GI upset is separate from the hydroxychloroquine, so discussed with patient to divide up her hydroxychloroquine doses to help increase tolerability in case and to monitor symptoms.     Since then, no further issues with GI upset and hydroxychloroquine.  Patient attributes this for possible stomach flu.  While her arthralgias are significantly improved (now able to  walk more frequently), she has nocturnal dull ankle pain that comes and goes, especially when she is trying to sleep.    Current Medications:  Hydroxychloroquine 400 mg daily-started 3/26/2024  PRN Celebrex    Medication History:  Patient has not been on biologic/DMARD  Bilateral knee CSI's over 10 years ago-ineffective  Bilateral Knee CSI 2/20/24  Bilateral knee Synvisc injections 3/26/14    Interval History:     2/20/24 Initial Consult:   I had the pleasure of seeing Ny Camacho on 2/20/2024 as a new outpatient consultation for positive MISTY. The patient was originally referred by her PCP, Dr Wil Reese.     53 year old female w/ PMH HTN, chondromalacia of the patella, impetigo who presents to clinic today.  Patient reports polyarthralgias affecting her hips, low back, hands, and knees.  Predominantly, however, patient especially notes significant knee pain that can interrupt her sleep described as a \"dull, aching \"pain.  All of her arthralgias are worse at the end of the day and worse with activity such as weightbearing activities like climbing the stairs.  No significant a.m. stiffness.  She has been attempting Celebrex 200 mg daily without much improvement of her symptoms.  ROS otherwise negative for fever, chills, unintentional weight loss, Raynaud's phenomenon, sicca symptoms, photosensitivity/rash, changes to urination such as hematuria/frothiness.  Family history notable for a mother with debilitating arthritis, however, unknown whether inflammatory versus degenerative.      3/26/24 Follow-Up: Patient feeling significantly improved since starting the p.o. prednisone taper, now able to complete her ADLs and was even gardening yesterday.  She is currently on 10 mg daily.  No longer taking Celebrex.  Noted minimal improvement with her previous bilateral knee CSI's that only lasted for about 10 days.  HISTORY:  Past Medical History:    Essential hypertension    Obesity, unspecified    lap band    Painful  knee    left knee pain no cartilage bones rubbing together      Social Hx Reviewed   Family Hx Reviewed     Medications (Active prior to today's visit):  Current Outpatient Medications   Medication Sig Dispense Refill    predniSONE 5 MG Oral Tab Take 1 tablet (5 mg total) by mouth daily as needed. Take as needed in the morning after breakfast for pain 30 tablet 2    CELECOXIB 200 MG Oral Cap TAKE 1 CAPSULE(200 MG) BY MOUTH DAILY 90 capsule 0    losartan-hydroCHLOROthiazide 100-25 MG Oral Tab TAKE 1 TABLET BY MOUTH DAILY 90 tablet 0    hydroxychloroquine 200 MG Oral Tab Take 2 tablets (400 mg total) by mouth daily. 180 tablet 2    Multiple Vitamins-Minerals (MULTIPLE VITAMINS/WOMENS OR) Take by mouth.      Cholecalciferol (VITAMIN D-1000 MAX ST) 25 MCG (1000 UT) Oral Tab Take 1 tablet (1,000 Units total) by mouth daily.  0       Allergies:  Allergies   Allergen Reactions    Asa [Aspirin] SWELLING     Tongue and lip swelling    Other      Bee Sting childhood unsure of reaction    Penicillins SWELLING     Tongue and lip swelling         ROS:   Review of Systems   Constitutional:  Negative for chills and fever.   HENT:  Negative for congestion, hearing loss, mouth sores, nosebleeds and trouble swallowing.    Eyes:  Negative for photophobia, pain, redness and visual disturbance.   Respiratory:  Negative for cough and shortness of breath.    Cardiovascular:  Negative for chest pain, palpitations and leg swelling.   Gastrointestinal:  Negative for abdominal pain, blood in stool, diarrhea and nausea.   Endocrine: Negative for cold intolerance and heat intolerance.   Genitourinary:  Negative for dysuria, frequency and hematuria.   Musculoskeletal:  Positive for arthralgias. Negative for back pain, gait problem, joint swelling, myalgias, neck pain and neck stiffness.   Skin:  Negative for color change and rash.   Neurological:  Negative for dizziness, weakness, numbness and headaches.   Psychiatric/Behavioral:  Negative for  confusion and dysphoric mood.         PHYSICAL EXAM:     Constitutional:  Well developed, Well nourished, No acute distress  HENT:  Normocephalic, Atraumatic, Bilateral external ears normal, Oropharynx moist, No oral exudates.  Neck: Normal range of motion, No tenderness, Supple, No stridor.  Eyes:  PERRL, EOMI, Conjunctiva normal, No discharge.  Respiratory:  Normal breath sounds, No respiratory distress, No wheezing.  Cardiovascular:  Normal heart rate, Normal rhythm, No murmurs, No rubs, No gallops.  GI:  Bowel sounds normal, Soft, No tenderness, No masses, No pulsatile masses.  : No CVA tenderness.  Musculoskeletal: A comprehensive 28 count joint exam was done and was negative for swelling or tenderness except as noted. Inspections for misalignment, asymmetry, crepitation, defects, tenderness, masses, nodules, effusions, range of motion, and stability in the upper and lower extremities bilaterally are all normal unless noted.           Integument:  Warm, Dry, No erythema, No rash.  Lymphatic:  No lymphadenopathy noted.  Neurologic:  Alert & oriented x 3, Normal motor function, Normal sensory function, No focal deficits noted.  Psychiatric:  Affect normal, Judgment normal, Mood normal.    LABS:   Prior lab work reviewed and notable for:    24:  MISTY 1: 5120 homogenous with reflex antibodies negative  dsDNA by IFA less than 10 WNL, dsDNA 2.4 WNL  C4 37.8 (high), C3 173.3 (high)  ESR 47 (high), CRP 1.9 (high)    TB testing and acute hepatitis panel negative    2024:  CMP with normal renal function, normal LFTs, no gamma gap  CBC without cytopenias and no leukocytosis  TSH 4.04 WNL    MISTY screen by IFA 1: 1280, nuclear/homogenous  RF less than 14, CCP less than 16  CRP 21.8 (high), ESR 33 (high)    Imagin2024 bilateral hand XR's:    L Hand Impression   CONCLUSION:  1. No acute fracture or dislocation.  2. Moderate arthritic changes involve the 1st carpal-metacarpal joint.  Mild degeneration MCP  and interphalangeal joints, with marked degeneration at the DIP joints 3rd and 4th digits.            R Hand Impression   CONCLUSION:  1. No acute fracture or dislocation.  2. Osteoarthritic changes involving the wrist and hand.     2/20/2024 left knee XR:    Impression   CONCLUSION:  1. No acute fracture or dislocation.  2. Moderate tricompartment osteoarthritis.  Mild joint effusion.       2/20/2024 lumbar spine XR:  Impression   CONCLUSION:  1. Chronic anterior spondylolisthesis L4 relative to L5.  2. S-shaped scoliosis and multilevel spondylosis.  3. No acute fracture or acute malalignment.       2/20/2024 SI joint XR:  Impression   CONCLUSION:  1. No acute fracture dislocation.  2. Mild bilateral SI joint degenerative changes, left greater than right.          1/17/2024 right knee XR:    Impression   CONCLUSION:       Negative for fracture or malalignment.  Moderate joint space loss of the medial compartment with tricompartmental osteophyte formation.  No patellar subluxation.  No joint effusion or focal soft tissue swelling.

## 2024-07-03 ENCOUNTER — TELEPHONE (OUTPATIENT)
Dept: RHEUMATOLOGY | Facility: CLINIC | Age: 54
End: 2024-07-03

## 2024-07-03 NOTE — TELEPHONE ENCOUNTER
Received a fax from patient's ophthalmologist, [facility], Dr. Ara aHyes. Last appointment 7/2/24. Will reach to confirm normal hydroxychloroquine exam.

## 2024-10-03 RX ORDER — GABAPENTIN 300 MG/1
300 CAPSULE ORAL NIGHTLY
Qty: 90 CAPSULE | Refills: 1 | Status: SHIPPED | OUTPATIENT
Start: 2024-10-03

## 2024-10-03 NOTE — TELEPHONE ENCOUNTER
Requested Prescriptions     Pending Prescriptions Disp Refills    gabapentin 100 MG Oral Cap [Pharmacy Med Name: GABAPENTIN 100MG CAPSULES] 90 capsule 1     Sig: TAKE 1 CAPSULE BY MOUTH BEFORE BEDTIME FOR 1 WEEK. IF NO SIGNIFICANT DAYTIME FATIGUE, LETHARGY, CAN INCREASE TO 3 CAPSULES     LOV: 6/28/24  Future Appointments   Date Time Provider Department Center   10/28/2024 10:00 AM [facility] NAPER NURSE [facility]   11/12/2024  2:30 PM Abdelrahman Nevarez DO [facility]  [place]   11/25/2024 10:00 AM [facility]APER NURSE [facility]   12/16/2024 10:00 AM [facility]APER NURSE [facility]   1/20/2025 10:00 AM [facility] NAPER NURSE [facility]   2/17/2025 10:00 AM [facility] INTERN NAPER NURSE [facility]     Labs:   Component      Latest Ref Rng 2/20/2024   Anti-SSA Antibody, IGG      <7 U/mL <0.4    Anti-SSB Antibody, IGG      <7 U/mL <0.4    Anti-Smith Antibody, IGG      <7 U/mL <0.7    Anti-U1RNP Antibody, IGG      <5 U/mL 0.8    Anti-RNP70 Antibody, IGG      <7 U/mL 0.9    Anti-Centromere Antibody, IGG      <7 U/mL <0.4    Anti-SCL70 Antibody, IGG      <7 U/mL <0.6    Anti-Quita-1 Antibody, IGG      <7 U/mL <0.3    Quantiferon TB NIL      IU/mL 0.00    Quantiferon-TB1 Minus NIL      IU/mL 0.00    Quantiferon-TB2 Minus NIL      IU/mL 0.00    Quantiferon TB Mitogen minus NIL      IU/mL >10.00    Quantiferon TB Result      Negative  Negative    HEPATITIS A AB, IGM      Nonreactive   Nonreactive    HBc IgM AB      Nonreactive   Nonreactive    HBSAg Screen      Nonreactive   Nonreactive    HCV AB      Nonreactive   Nonreactive    MISTY Titer/Pattern      <80  5120 !    Reviewed By: Jenny Crystal M.D.    MISTY SCREEN WITH REFLEX (S)      Negative  Positive !    C-REACTIVE PROTEIN      <1.00 mg/dL 1.90 (H)    SED RATE      0 - 30 mm/Hr 47 (H)    COMPLEMENT C3      90.0 - 170.0 mg/dL 173.3 (H)    COMPLEMENT C4      12.0 - 36.0 mg/dL  37.8 (H)    Anti-dsDNA antibody      <10 IU/mL 2.4    Anti Double Strand DNA      <10  <10       Legend:  ! Abnormal  (H) High      PLAN:  - Will trial gabapentin given nocturnal ankle pain.  Start with 100 mg before bedtime.  Discussed with patient if symptoms persist and no significant lethargy, confusion, malaise during the daytime, can increase to 300 mg of gabapentin before bedtime.       -If symptoms persist despite gabapentin, could consider ankle CSI's.  -Continue hydroxychloroquine 400 mg daily (less than 5 mg/kg)       -Discussed with patient side effects of Plaquenil which include but are not limited to retinopathy skin changes, blood abnormalities, hepatotoxicity, cardiotoxicity, neuro changes such as dizziness, fatigue, irritability, myopathy.  Pending patient response, patient will require annual Plaquenil eye exams if continued on chronic Plaquenil.  -PRN Celebrex 100 mg BID with meals   -PRN Prednisone 5 mg q AM for breakthrough pain if Celebrex ineffective  - Consult/evaluation communicated with referring physician/provider.        -[facility] ophthalmology appointment next week.  Discussed with patient to have records of her Plaquenil eye exam faxed to our office.     Abdelrahman Nevarez DO  6/28/2024   10:36 AM

## 2024-11-06 ENCOUNTER — TELEPHONE (OUTPATIENT)
Dept: RHEUMATOLOGY | Facility: CLINIC | Age: 54
End: 2024-11-06

## 2024-11-06 RX ORDER — CELECOXIB 200 MG/1
CAPSULE ORAL
Qty: 60 CAPSULE | Refills: 2 | Status: SHIPPED | OUTPATIENT
Start: 2024-11-06

## 2024-11-06 NOTE — TELEPHONE ENCOUNTER
Patient notified and verbalized understanding.     Future Appointments   Date Time Provider Department Center   11/12/2024  2:30 PM Lehne, Ramier, DO [facility] [place]   11/25/2024 10:00 AM [facility] INTERN NAPER NURSE [facility]   12/16/2024 10:00 AM [facility] INTERN NAPER NURSE [facility]   1/20/2025 10:00 AM [facility] INTERN NAPER NURSE [facility]   2/17/2025 10:00 AM [facility] INTERN NAPER NURSE [facility]

## 2024-11-06 NOTE — TELEPHONE ENCOUNTER
Patient calling to ask if needs to complete blood work before visit on 11/12/24, asking for call back.

## 2024-11-06 NOTE — TELEPHONE ENCOUNTER
LOV: 6/28/24  Future Appointments   Date Time Provider Department Center   11/12/2024  2:30 PM Lehne, Ramier, DO [facility] [place]   11/25/2024 10:00 AM [facility] INTERN NAPER NURSE [facility]   12/16/2024 10:00 AM [facility] INTERN NAPER NURSE [facility]   1/20/2025 10:00 AM [facility] INTERN NAPER NURSE [facility]   2/17/2025 10:00 AM [facility] INTERN NAPER NURSE [facility]       RHEUMATOLOGY CLINIC- FOLLOW UP     Ny Camacho is a 53 year old female.     ASSESSMENT/PLAN:          ICD-10-CM     1. Seronegative rheumatoid arthritis (HCC)  M06.00         2. MISTY positive  R76.8       2/20/24: MISYT 1: 5120 homogenous with reflex antibodies negative,dsDNA by IFA <10, dsDNA 2.4 WNL, C4 37.8, C3 173.3 , ESR 47 , CRP 1.9       3. Osteoarthritis of both knees, unspecified osteoarthritis type  M17.0         4. Encounter for long-term (current) use of high-risk medication  Z79.899               DISCUSSION:  Patient presents for follow-up of seronegative rheumatoid arthritis (positive MISTY).  Overall, patient's presentation is consistent with an underlying inflammatory throughout the; specifically, seronegative rheumatoid arthritis.  Patient's symptoms significantly improved on hydroxychloroquine, therefore, we will continue at the same dose.  Okay for as needed Celebrex use and can add on a as needed prednisone for breakthrough pain if needed.  Patient is scheduled for a Plaquenil eye exam next week.  For her nocturnal ankle pain, will trial gabapentin.  If symptoms persist, could consider ankle CSI.        PLAN:  - Will trial gabapentin given nocturnal ankle pain.  Start with 100 mg before bedtime.  Discussed with patient if symptoms persist and no significant lethargy, confusion, malaise during the daytime, can increase to 300 mg of gabapentin before bedtime.       -If symptoms persist despite gabapentin, could consider ankle CSI's.  -Continue hydroxychloroquine  400 mg daily (less than 5 mg/kg)       -Discussed with patient side effects of Plaquenil which include but are not limited to retinopathy skin changes, blood abnormalities, hepatotoxicity, cardiotoxicity, neuro changes such as dizziness, fatigue, irritability, myopathy.  Pending patient response, patient will require annual Plaquenil eye exams if continued on chronic Plaquenil.  -PRN Celebrex 100 mg BID with meals   -PRN Prednisone 5 mg q AM for breakthrough pain if Celebrex ineffective  - Consult/evaluation communicated with referring physician/provider.        -[facility] ophthalmology appointment next week.  Discussed with patient to have records of her Plaquenil eye exam faxed to our office.     Abdelrahman Nevarez DO  6/28/2024   10:36 AM

## 2024-11-06 NOTE — TELEPHONE ENCOUNTER
Patient has results scanned form 9/18/2024. Would you like her to go ahead and repeat now before appointment or wait another 4-6 weeks?

## 2024-11-06 NOTE — TELEPHONE ENCOUNTER
Hello-patient does not need additional blood work prior to her upcoming appointment.  Okay for the scan results.  Thank you.

## 2024-11-12 ENCOUNTER — OFFICE VISIT (OUTPATIENT)
Dept: RHEUMATOLOGY | Facility: CLINIC | Age: 54
End: 2024-11-12
Payer: COMMERCIAL

## 2024-11-12 VITALS — BODY MASS INDEX: 36.48 KG/M2 | HEIGHT: 60 IN | WEIGHT: 185.81 LBS

## 2024-11-12 DIAGNOSIS — Z79.899 ENCOUNTER FOR LONG-TERM (CURRENT) USE OF HIGH-RISK MEDICATION: ICD-10-CM

## 2024-11-12 DIAGNOSIS — R76.8 ANA POSITIVE: ICD-10-CM

## 2024-11-12 DIAGNOSIS — M17.0 OSTEOARTHRITIS OF BOTH KNEES, UNSPECIFIED OSTEOARTHRITIS TYPE: ICD-10-CM

## 2024-11-12 DIAGNOSIS — M06.00 SERONEGATIVE RHEUMATOID ARTHRITIS (HCC): Primary | ICD-10-CM

## 2024-11-12 PROCEDURE — G2211 COMPLEX E/M VISIT ADD ON: HCPCS | Performed by: INTERNAL MEDICINE

## 2024-11-12 PROCEDURE — 99214 OFFICE O/P EST MOD 30 MIN: CPT | Performed by: INTERNAL MEDICINE

## 2024-11-12 PROCEDURE — 3008F BODY MASS INDEX DOCD: CPT | Performed by: INTERNAL MEDICINE

## 2024-11-12 NOTE — PROGRESS NOTES
RHEUMATOLOGY CLINIC- FOLLOW UP    Ny Camacho is a 54 year old female.    ASSESSMENT/PLAN:       ICD-10-CM    1. Seronegative rheumatoid arthritis (HCC):+MISTY 5120  M06.00       2. Osteoarthritis of both knees, unspecified osteoarthritis type  M17.0       3. MISTY positive  R76.8       4. Encounter for long-term (current) use of high-risk medication  Z79.899           DISCUSSION:  Patient presents for follow-up of seronegative rheumatoid arthritis (positive MISTY).  Overall, patient symptoms improved since starting hydroxychloroquine.  Reached out to patient's optometrist today regarding last hydroxychloroquine eye exam.  Discussed with patient to change celebrex to as needed dosing.      PLAN:  -Continue hydroxychloroquine 400 mg daily (less than 5 mg/kg)       -Discussed with patient side effects of Plaquenil which include but are not limited to retinopathy skin changes, blood abnormalities, hepatotoxicity, cardiotoxicity, neuro changes such as dizziness, fatigue, irritability, myopathy.  Pending patient response, patient will require annual Plaquenil eye exams if continued on chronic Plaquenil.  -PRN Celebrex 100 mg BID with meals   -PRN Prednisone 5 mg q AM for breakthrough pain if Celebrex ineffective  - Consult/evaluation communicated with referring physician/provider.        -Dr. Ara Hayes Appt 7/2/24. Discussed with patient to have records of her Plaquenil eye exam faxed to our office.    Patient to f/u Spring 2024.  Abdelrahman Nevarez DO  11/12/2024   1:01 PM    There is a longitudinal care relationship with me, the care plan reflects the ongoing nature of the continuous relationship of care, and the medical record indicates that there is ongoing treatment of a serious/complex medical condition which I am currently managing.  is Applicable.     Discussed with patient that they are on chronic treatment with a high-risk medication that requires close lab monitoring for possible drug toxicity.   Additionally, discussed with patient give the possible immunosuppressive effects of their medication as well as their underlying hyper-inflammatory state, the importance of keeping vaccinations and age-appropriate screenings up-to-date, given increased risk of complications and malignancies seen in these patient populations.  Patient to f/u with repeat labs drawn prior (standing labs ordered).  Last QuantiFERON TB testin24  Last Hepatitis testin24         Subjective:   24 Follow-Up: Received a fax from patient's ophthalmologist, [facility], Dr. Ara Hayes. Last appointment 24.  Called again this afternoon to discuss recent eye exam.  Patient reporting significant improvement in her arthralgias since starting hydroxychloroquine, has only needed prednisone for a total of 3 times since her last appointment.  Still taking Celebrex twice a day though.  Reports no specific concerns with her last eye exam.        Current Medications:  Hydroxychloroquine 400 mg daily-started 3/26/2024  PRN Celebrex  PRN Prednisone 5 mg- rare    Medication History:  Patient has not been on biologic/DMARD  Bilateral knee CSI's over 10 years ago-ineffective  Bilateral Knee CSI 24  Bilateral knee Synvisc injections 3/26/14    Interval History:     24 Initial Consult:   I had the pleasure of seeing Ny Camacho on 2024 as a new outpatient consultation for positive MISTY. The patient was originally referred by her PCP, Dr Wil Reese.     53 year old female w/ PMH HTN, chondromalacia of the patella, impetigo who presents to clinic today.  Patient reports polyarthralgias affecting her hips, low back, hands, and knees.  Predominantly, however, patient especially notes significant knee pain that can interrupt her sleep described as a \"dull, aching \"pain.  All of her arthralgias are worse at the end of the day and worse with activity such as weightbearing activities like climbing the stairs.   No significant a.m. stiffness.  She has been attempting Celebrex 200 mg daily without much improvement of her symptoms.  ROS otherwise negative for fever, chills, unintentional weight loss, Raynaud's phenomenon, sicca symptoms, photosensitivity/rash, changes to urination such as hematuria/frothiness.  Family history notable for a mother with debilitating arthritis, however, unknown whether inflammatory versus degenerative.      3/26/24 Follow-Up: Patient feeling significantly improved since starting the p.o. prednisone taper, now able to complete her ADLs and was even gardening yesterday.  She is currently on 10 mg daily.  No longer taking Celebrex.  Noted minimal improvement with her previous bilateral knee CSI's that only lasted for about 10 days.    6/28/24 Follow-Up: In the interim, since the patient's last appointment, I returned patient's call 4/9 during the afternoon.  Patient started hydroxychloroquine 400 mg about 1 week prior.  However, started noting GI upset a week after.  Additionally, her GI symptoms just started about 12 hours after her morning doses of hydroxychloroquine.  I suspected her GI upset is separate from the hydroxychloroquine, so discussed with patient to divide up her hydroxychloroquine doses to help increase tolerability in case and to monitor symptoms.     Since then, no further issues with GI upset and hydroxychloroquine.  Patient attributes this for possible stomach flu.  While her arthralgias are significantly improved (now able to walk more frequently), she has nocturnal dull ankle pain that comes and goes, especially when she is trying to sleep.  HISTORY:  Past Medical History:    Essential hypertension    Obesity, unspecified    lap band    Painful knee    left knee pain no cartilage bones rubbing together      Social Hx Reviewed   Family Hx Reviewed     Medications (Active prior to today's visit):  Current Outpatient Medications   Medication Sig Dispense Refill    CELECOXIB 200  MG Oral Cap TAKE 1 CAPSULE(200 MG) BY MOUTH TWICE DAILY WITH FOOD AS NEEDED FOR PAIN. DO NOT TAKE OTHER NSAIDS ON THIS MEDICATION 60 capsule 2    gabapentin 300 MG Oral Cap Take 1 capsule (300 mg total) by mouth nightly. TAKE 1 CAPSULE BY MOUTH BEFORE BEDTIME FOR 1 WEEK. IF NO SIGNIFICANT DAYTIME FATIGUE, LETHARGY, CAN INCREASE TO 3 CAPSULES 90 capsule 1    losartan-hydroCHLOROthiazide 100-25 MG Oral Tab Take 1 tablet by mouth daily. 30 tablet 1    hydroxychloroquine 200 MG Oral Tab Take 2 tablets (400 mg total) by mouth daily. 180 tablet 2    Multiple Vitamins-Minerals (MULTIPLE VITAMINS/WOMENS OR) Take by mouth.      Cholecalciferol (VITAMIN D-1000 MAX ST) 25 MCG (1000 UT) Oral Tab Take 1 tablet (1,000 Units total) by mouth daily.  0       Allergies:  Allergies   Allergen Reactions    Asa [Aspirin] SWELLING     Tongue and lip swelling    Other      Bee Sting childhood unsure of reaction    Penicillins SWELLING     Tongue and lip swelling         ROS:   Review of Systems   Constitutional:  Negative for chills and fever.   HENT:  Negative for congestion, hearing loss, mouth sores, nosebleeds and trouble swallowing.    Eyes:  Negative for photophobia, pain, redness and visual disturbance.   Respiratory:  Negative for cough and shortness of breath.    Cardiovascular:  Negative for chest pain, palpitations and leg swelling.   Gastrointestinal:  Negative for abdominal pain, blood in stool, diarrhea and nausea.   Endocrine: Negative for cold intolerance and heat intolerance.   Genitourinary:  Negative for dysuria, frequency and hematuria.   Musculoskeletal:  Negative for arthralgias, back pain, gait problem, joint swelling, myalgias, neck pain and neck stiffness.   Skin:  Negative for color change and rash.   Neurological:  Negative for dizziness, weakness, numbness and headaches.   Psychiatric/Behavioral:  Negative for confusion and dysphoric mood.         PHYSICAL EXAM:     Constitutional:  Well developed, Well  nourished, No acute distress  HENT:  Normocephalic, Atraumatic, Bilateral external ears normal, Oropharynx moist, No oral exudates.  Neck: Normal range of motion, No tenderness, Supple, No stridor.  Eyes:  PERRL, EOMI, Conjunctiva normal, No discharge.  Respiratory:  Normal breath sounds, No respiratory distress, No wheezing.  Cardiovascular:  Normal heart rate, Normal rhythm, No murmurs, No rubs, No gallops.  GI:  Bowel sounds normal, Soft, No tenderness, No masses, No pulsatile masses.  : No CVA tenderness.  Musculoskeletal: A comprehensive 28 count joint exam was done and was negative for swelling or tenderness except as noted. Inspections for misalignment, asymmetry, crepitation, defects, tenderness, masses, nodules, effusions, range of motion, and stability in the upper and lower extremities bilaterally are all normal unless noted.           Integument:  Warm, Dry, No erythema, No rash.  Lymphatic:  No lymphadenopathy noted.  Neurologic:  Alert & oriented x 3, Normal motor function, Normal sensory function, No focal deficits noted.  Psychiatric:  Affect normal, Judgment normal, Mood normal.    LABS:   Prior lab work reviewed and notable for:    24 Outside Labs:   CMP with BUN 16, CR 0.91, LFTs nonelevated, no gamma gap  CBC with normal WBC/Hgb/PLT    24:  MISTY 1: 5120 homogenous with reflex antibodies negative  dsDNA by IFA less than 10 WNL, dsDNA 2.4 WNL  C4 37.8 (high), C3 173.3 (high)  ESR 47 (high), CRP 1.9 (high)    TB testing and acute hepatitis panel negative    2024:  CMP with normal renal function, normal LFTs, no gamma gap  CBC without cytopenias and no leukocytosis  TSH 4.04 WNL    MISTY screen by IFA 1: 1280, nuclear/homogenous  RF less than 14, CCP less than 16  CRP 21.8 (high), ESR 33 (high)    Imagin2024 bilateral hand XR's:    L Hand Impression   CONCLUSION:  1. No acute fracture or dislocation.  2. Moderate arthritic changes involve the 1st carpal-metacarpal joint.   Mild degeneration MCP and interphalangeal joints, with marked degeneration at the DIP joints 3rd and 4th digits.            R Hand Impression   CONCLUSION:  1. No acute fracture or dislocation.  2. Osteoarthritic changes involving the wrist and hand.     2/20/2024 left knee XR:    Impression   CONCLUSION:  1. No acute fracture or dislocation.  2. Moderate tricompartment osteoarthritis.  Mild joint effusion.       2/20/2024 lumbar spine XR:  Impression   CONCLUSION:  1. Chronic anterior spondylolisthesis L4 relative to L5.  2. S-shaped scoliosis and multilevel spondylosis.  3. No acute fracture or acute malalignment.       2/20/2024 SI joint XR:  Impression   CONCLUSION:  1. No acute fracture dislocation.  2. Mild bilateral SI joint degenerative changes, left greater than right.          1/17/2024 right knee XR:    Impression   CONCLUSION:       Negative for fracture or malalignment.  Moderate joint space loss of the medial compartment with tricompartmental osteophyte formation.  No patellar subluxation.  No joint effusion or focal soft tissue swelling.

## 2024-11-14 ENCOUNTER — TELEPHONE (OUTPATIENT)
Dept: RHEUMATOLOGY | Facility: CLINIC | Age: 54
End: 2024-11-14

## 2024-11-14 NOTE — TELEPHONE ENCOUNTER
Received a fax from patient's ophthalmologist, Dr. Ara Hayes O.D. also, called office to confirm-no hydroxychloroquine side effects noted on exam.  Encounter dated 7/19/2024

## 2024-12-17 RX ORDER — HYDROXYCHLOROQUINE SULFATE 200 MG/1
400 TABLET, FILM COATED ORAL DAILY
Qty: 180 TABLET | Refills: 2 | Status: SHIPPED | OUTPATIENT
Start: 2024-12-17

## 2024-12-17 NOTE — TELEPHONE ENCOUNTER
LOV: 11/12/24  Future Appointments   Date Time Provider Department Center   1/20/2025  9:00 AM Wil Reese MD [facility]   2/17/2025 10:00 AM [facility]   5/2/2025  2:30 PM Abdelrahman Nevarez DO [facility] [place]       RHEUMATOLOGY CLINIC- FOLLOW UP     Ny Camacho is a 54 year old female.     ASSESSMENT/PLAN:          ICD-10-CM     1. Seronegative rheumatoid arthritis (HCC):+MISTY 5120  M06.00         2. Osteoarthritis of both knees, unspecified osteoarthritis type  M17.0         3. MISTY positive  R76.8         4. Encounter for long-term (current) use of high-risk medication  Z79.899               DISCUSSION:  Patient presents for follow-up of seronegative rheumatoid arthritis (positive MISTY).  Overall, patient symptoms improved since starting hydroxychloroquine.  Reached out to patient's optometrist today regarding last hydroxychloroquine eye exam.  Discussed with patient to change celebrex to as needed dosing.        PLAN:  -Continue hydroxychloroquine 400 mg daily (less than 5 mg/kg)       -Discussed with patient side effects of Plaquenil which include but are not limited to retinopathy skin changes, blood abnormalities, hepatotoxicity, cardiotoxicity, neuro changes such as dizziness, fatigue, irritability, myopathy.  Pending patient response, patient will require annual Plaquenil eye exams if continued on chronic Plaquenil.  -PRN Celebrex 100 mg BID with meals   -PRN Prednisone 5 mg q AM for breakthrough pain if Celebrex ineffective  - Consult/evaluation communicated with referring physician/provider.        -Dr. Ara Hayes Appt 7/2/24. Discussed with patient to have records of her Plaquenil eye exam faxed to our office.     Patient to f/u Spring 2024.  Abdelrahman Nevarez DO  11/12/2024   1:01 PM

## 2025-01-07 ENCOUNTER — MED REC SCAN ONLY (OUTPATIENT)
Dept: RHEUMATOLOGY | Facility: CLINIC | Age: 55
End: 2025-01-07

## 2025-01-08 ENCOUNTER — TELEPHONE (OUTPATIENT)
Dept: RHEUMATOLOGY | Facility: CLINIC | Age: 55
End: 2025-01-08

## 2025-01-08 NOTE — TELEPHONE ENCOUNTER
Received a fax from patient's ophthalmologist, Dr. Ara Hayes, regarding appointment 1/6/2025,  While on hydroxychloroquine.  Recommended once yearly eye exams.

## 2025-02-03 RX ORDER — CELECOXIB 200 MG/1
CAPSULE ORAL
Qty: 60 CAPSULE | Refills: 2 | Status: SHIPPED | OUTPATIENT
Start: 2025-02-03

## 2025-02-03 NOTE — TELEPHONE ENCOUNTER
Requested Prescriptions     Pending Prescriptions Disp Refills    CELECOXIB 200 MG Oral Cap [Pharmacy Med Name: CELECOXIB 200MG CAPSULES] 60 capsule 2     Sig: TAKE 1 CAPSULE(200 MG) BY MOUTH TWICE DAILY WITH FOOD AS NEEDED FOR PAIN. DO NOT TAKE OTHER NSAIDS ON THIS MEDICATION     Future Appointments   Date Time Provider Department Center   2/17/2025 10:00 AM [facility] EPI NURSE [facility]   2/18/2025  9:40 AM [facility]   5/2/2025  2:30 PM [facility] [place]     LOV: 11/12/2024   Last Refilled:11/6/24 #60 2RF       PLAN:  -Continue hydroxychloroquine 400 mg daily (less than 5 mg/kg)       -Discussed with patient side effects of Plaquenil which include but are not limited to retinopathy skin changes, blood abnormalities, hepatotoxicity, cardiotoxicity, neuro changes such as dizziness, fatigue, irritability, myopathy.  Pending patient response, patient will require annual Plaquenil eye exams if continued on chronic Plaquenil.  -PRN Celebrex 100 mg BID with meals   -PRN Prednisone 5 mg q AM for breakthrough pain if Celebrex ineffective  - Consult/evaluation communicated with referring physician/provider.        -Dr. Ara Hayes Appt 7/2/24. Discussed with patient to have records of her Plaquenil eye exam faxed to our office.     Patient to f/u Spring 2024.  Abdelrahman Nevarez DO  11/12/2024   1:01 PM

## 2025-02-18 ENCOUNTER — HOSPITAL ENCOUNTER (OUTPATIENT)
Dept: MAMMOGRAPHY | Age: 55
Discharge: HOME | End: 2025-02-18
Attending: INTERNAL MEDICINE
Payer: COMMERCIAL

## 2025-02-18 DIAGNOSIS — Z12.31 VISIT FOR SCREENING MAMMOGRAM: ICD-10-CM

## 2025-02-18 PROCEDURE — 77063 BREAST TOMOSYNTHESIS BI: CPT | Performed by: INTERNAL MEDICINE

## 2025-02-18 PROCEDURE — 77067 SCR MAMMO BI INCL CAD: CPT | Performed by: INTERNAL MEDICINE

## 2025-03-25 RX ORDER — PREDNISONE 5 MG/1
TABLET ORAL
Qty: 60 TABLET | Refills: 0 | Status: SHIPPED | OUTPATIENT
Start: 2025-03-25 | End: 2025-05-06

## 2025-04-24 ENCOUNTER — PATIENT MESSAGE (OUTPATIENT)
Dept: RHEUMATOLOGY | Facility: CLINIC | Age: 55
End: 2025-04-24

## 2025-05-02 ENCOUNTER — OFFICE VISIT (OUTPATIENT)
Dept: RHEUMATOLOGY | Facility: CLINIC | Age: 55
End: 2025-05-02
Payer: COMMERCIAL

## 2025-05-02 VITALS
DIASTOLIC BLOOD PRESSURE: 70 MMHG | HEART RATE: 84 BPM | SYSTOLIC BLOOD PRESSURE: 140 MMHG | BODY MASS INDEX: 39.46 KG/M2 | HEIGHT: 60 IN | WEIGHT: 201 LBS

## 2025-05-02 DIAGNOSIS — M06.00 SERONEGATIVE RHEUMATOID ARTHRITIS (HCC): Primary | ICD-10-CM

## 2025-05-02 DIAGNOSIS — Z79.899 ENCOUNTER FOR LONG-TERM (CURRENT) USE OF HIGH-RISK MEDICATION: ICD-10-CM

## 2025-05-02 DIAGNOSIS — M17.0 OSTEOARTHRITIS OF BOTH KNEES, UNSPECIFIED OSTEOARTHRITIS TYPE: ICD-10-CM

## 2025-05-02 DIAGNOSIS — R76.8 ANA POSITIVE: ICD-10-CM

## 2025-05-02 PROCEDURE — 99215 OFFICE O/P EST HI 40 MIN: CPT | Performed by: INTERNAL MEDICINE

## 2025-05-02 PROCEDURE — G2211 COMPLEX E/M VISIT ADD ON: HCPCS | Performed by: INTERNAL MEDICINE

## 2025-05-02 PROCEDURE — 3078F DIAST BP <80 MM HG: CPT | Performed by: INTERNAL MEDICINE

## 2025-05-02 PROCEDURE — 3077F SYST BP >= 140 MM HG: CPT | Performed by: INTERNAL MEDICINE

## 2025-05-02 PROCEDURE — 3008F BODY MASS INDEX DOCD: CPT | Performed by: INTERNAL MEDICINE

## 2025-05-02 RX ORDER — DICLOFENAC SODIUM 75 MG/1
75 TABLET, DELAYED RELEASE ORAL 2 TIMES DAILY
Qty: 60 TABLET | Refills: 2 | Status: SHIPPED | OUTPATIENT
Start: 2025-05-02

## 2025-05-02 NOTE — PROGRESS NOTES
RHEUMATOLOGY CLINIC- FOLLOW UP    Ny Camacho is a 54 year old female.    ASSESSMENT/PLAN:       ICD-10-CM    1. Seronegative rheumatoid arthritis (HCC):+MISTY 5120  M06.00       2. Osteoarthritis of both knees, unspecified osteoarthritis type  M17.0       3. MISTY positive  R76.8       4. Encounter for long-term (current) use of high-risk medication  Z79.899         DISCUSSION:  Patient presents for follow-up of seronegative rheumatoid arthritis (positive MISTY).  Previously, patient symptoms improved since starting hydroxychloroquine.  However, arthralgias have been acting up more recently despite 2 prednisone tapers and Celebrex.  No synovitis on current exam and there could be a component of underlying mechanical, degenerative osteoarthritic pain.  For now, discussed with patient can discontinue prednisone, as not entirely helpful.  Will modify Celebrex to Voltaren.  Patient to contact us in 2 weeks and if persistent, would consider follow-up and possible modification of DMARD therapy.    PLAN:  -Discontinue Celebrex.  Instead,  Start: Voltaren 50 mg BID with food for 2 weeks. Then, after 2 weeks, change to PRN Voltaren 50 mg BID with food for breakthrough pain.             -Discussed with patient to avoid other OTC NSAIDs while on this medication given increased risk of side effects including GI upset and GI bleed.   -Continue hydroxychloroquine 400 mg daily (less than 5 mg/kg)       -Prev discussed with patient side effects of Plaquenil which include but are not limited to retinopathy skin changes, blood abnormalities, hepatotoxicity, cardiotoxicity, neuro changes such as dizziness, fatigue, irritability, myopathy.  Pending patient response, patient will require annual Plaquenil eye exams if continued on chronic Plaquenil.  -PRN Prednisone 5 mg q AM for breakthrough pain  - Consult/evaluation communicated with referring physician/provider.        -Dr. Ara Hayes Appt 1/6/2025. Rec f/u once yearly.      Patient to contact us in about 2 weeks after trial of Voltaren.  Follow-up depending symptoms.    Abdelrahman NevarezDO  2025   2:55 PM      There is a longitudinal care relationship with me, the care plan reflects the ongoing nature of the continuous relationship of care, and the medical record indicates that there is ongoing treatment of a serious/complex medical condition which I am currently managing.  is Applicable.     Discussed with patient that they are on chronic treatment with a high-risk medication that requires close lab monitoring for possible drug toxicity.  Additionally, discussed with patient give the possible immunosuppressive effects of their medication as well as their underlying hyper-inflammatory state, the importance of keeping vaccinations and age-appropriate screenings up-to-date, given increased risk of complications and malignancies seen in these patient populations.  Patient to f/u with repeat labs drawn prior (standing labs ordered).  Last QuantiFERON TB testin24  Last Hepatitis testin24         Subjective:     25 Follow-Up: Received a fax from patient's ophthalmologist, Dr. Ara Hayes, regarding appointment 2025,  While on hydroxychloroquine.  Recommended once yearly eye exams.    More recently, polyarthralgias, acting up.  Symptoms worse with weightbearing such as getting up from sitting, activities, stairs.  For example, patient felt worse after gardening.  However, notes it feels like when she was originally diagnosed prior to the hydroxychloroquine.  Symptoms persisting despite 2 prednisone tapers and Celebrex.      Current Medications:  Hydroxychloroquine 400 mg daily-started 3/26/2024  PRN Celebrex    Prednisone taper- on Prednisone 10 mg daily currently       Medication History:  Patient has not been on biologic/DMARD  Bilateral knee CSI's over 10 years ago-ineffective  Bilateral Knee CSI 24  Bilateral knee Synvisc injections  3/26/14    Interval History:     2/20/24 Initial Consult:   I had the pleasure of seeing Ny Camacho on 2/20/2024 as a new outpatient consultation for positive MISTY. The patient was originally referred by her PCP, Dr Wil Reese.     53 year old female w/ PMH HTN, chondromalacia of the patella, impetigo who presents to clinic today.  Patient reports polyarthralgias affecting her hips, low back, hands, and knees.  Predominantly, however, patient especially notes significant knee pain that can interrupt her sleep described as a \"dull, aching \"pain.  All of her arthralgias are worse at the end of the day and worse with activity such as weightbearing activities like climbing the stairs.  No significant a.m. stiffness.  She has been attempting Celebrex 200 mg daily without much improvement of her symptoms.  ROS otherwise negative for fever, chills, unintentional weight loss, Raynaud's phenomenon, sicca symptoms, photosensitivity/rash, changes to urination such as hematuria/frothiness.  Family history notable for a mother with debilitating arthritis, however, unknown whether inflammatory versus degenerative.      3/26/24 Follow-Up: Patient feeling significantly improved since starting the p.o. prednisone taper, now able to complete her ADLs and was even gardening yesterday.  She is currently on 10 mg daily.  No longer taking Celebrex.  Noted minimal improvement with her previous bilateral knee CSI's that only lasted for about 10 days.    6/28/24 Follow-Up: In the interim, since the patient's last appointment, I returned patient's call 4/9 during the afternoon.  Patient started hydroxychloroquine 400 mg about 1 week prior.  However, started noting GI upset a week after.  Additionally, her GI symptoms just started about 12 hours after her morning doses of hydroxychloroquine.  I suspected her GI upset is separate from the hydroxychloroquine, so discussed with patient to divide up her hydroxychloroquine doses to help  increase tolerability in case and to monitor symptoms.     Since then, no further issues with GI upset and hydroxychloroquine.  Patient attributes this for possible stomach flu.  While her arthralgias are significantly improved (now able to walk more frequently), she has nocturnal dull ankle pain that comes and goes, especially when she is trying to sleep.    11/12/24 Follow-Up: Received a fax from patient's ophthalmologist, [facility], Dr. Ara Hayes. Last appointment 7/2/24.  Called again this afternoon to discuss recent eye exam.  Patient reporting significant improvement in her arthralgias since starting hydroxychloroquine, has only needed prednisone for a total of 3 times since her last appointment.  Still taking Celebrex twice a day though.  Reports no specific concerns with her last eye exam.    HISTORY:  Past Medical History:    Essential hypertension    Obesity, unspecified    lap band    Painful knee    left knee pain no cartilage bones rubbing together      Social Hx Reviewed   Family Hx Reviewed     Medications (Active prior to today's visit):  Current Outpatient Medications   Medication Sig Dispense Refill    predniSONE 5 MG Oral Tab Take 4 tablets (20 mg total) by mouth daily for 3 days, THEN 3 tablets (15 mg total) daily for 3 days, THEN 2 tablets (10 mg total) daily for 3 days, THEN 1 tablet (5 mg total) daily for 3 days, THEN 1 tablet (5 mg total) daily as needed. Take as instructed in the morning with breakfast for 12 days.  Then, after 12 days, only take prednisone up to 1 tablet daily as needed for breakthrough pain.. 60 tablet 0    ergocalciferol 1.25 MG (16833 UT) Oral Cap Take 1 capsule (50,000 Units total) by mouth once a week. 12 capsule 3    LOSARTAN-HYDROCHLOROTHIAZIDE 100-25 MG Oral Tab TAKE 1 TABLET BY MOUTH DAILY 90 tablet 0    CELECOXIB 200 MG Oral Cap TAKE 1 CAPSULE(200 MG) BY MOUTH TWICE DAILY WITH FOOD AS NEEDED FOR PAIN. DO NOT TAKE OTHER NSAIDS ON THIS MEDICATION 60  capsule 2    HYDROXYCHLOROQUINE 200 MG Oral Tab TAKE 2 TABLETS(400 MG) BY MOUTH DAILY 180 tablet 2    gabapentin 300 MG Oral Cap Take 1 capsule (300 mg total) by mouth nightly. TAKE 1 CAPSULE BY MOUTH BEFORE BEDTIME FOR 1 WEEK. IF NO SIGNIFICANT DAYTIME FATIGUE, LETHARGY, CAN INCREASE TO 3 CAPSULES 90 capsule 1    Multiple Vitamins-Minerals (MULTIPLE VITAMINS/WOMENS OR) Take by mouth.      Cholecalciferol (VITAMIN D-1000 MAX ST) 25 MCG (1000 UT) Oral Tab Take 1 tablet (1,000 Units total) by mouth daily.  0       Allergies:  Allergies   Allergen Reactions    Asa [Aspirin] SWELLING     Tongue and lip swelling    Other      Bee Sting childhood unsure of reaction    Penicillins SWELLING     Tongue and lip swelling         ROS:   Review of Systems   Constitutional:  Negative for chills and fever.   HENT:  Negative for congestion, hearing loss, mouth sores, nosebleeds and trouble swallowing.    Eyes:  Negative for photophobia, pain, redness and visual disturbance.   Respiratory:  Negative for cough and shortness of breath.    Cardiovascular:  Negative for chest pain, palpitations and leg swelling.   Gastrointestinal:  Negative for abdominal pain, blood in stool, diarrhea and nausea.   Endocrine: Negative for cold intolerance and heat intolerance.   Genitourinary:  Negative for dysuria, frequency and hematuria.   Musculoskeletal:  Positive for arthralgias, back pain and gait problem. Negative for joint swelling, myalgias, neck pain and neck stiffness.   Skin:  Negative for color change and rash.   Neurological:  Negative for dizziness, weakness, numbness and headaches.   Psychiatric/Behavioral:  Negative for confusion and dysphoric mood.         PHYSICAL EXAM:     Constitutional:  Well developed, Well nourished, No acute distress  HENT:  Normocephalic, Atraumatic, Bilateral external ears normal, Oropharynx moist, No oral exudates.  Neck: Normal range of motion, No tenderness, Supple, No stridor.  Eyes:  PERRL, EOMI,  Conjunctiva normal, No discharge.  Respiratory:  Normal breath sounds, No respiratory distress, No wheezing.  Cardiovascular:  Normal heart rate, Normal rhythm, No murmurs, No rubs, No gallops.  GI:  Bowel sounds normal, Soft, No tenderness, No masses, No pulsatile masses.  : No CVA tenderness.  Musculoskeletal: A comprehensive 28 count joint exam was done and was negative for swelling or tenderness except as noted. Inspections for misalignment, asymmetry, crepitation, defects, tenderness, masses, nodules, effusions, range of motion, and stability in the upper and lower extremities bilaterally are all normal unless noted.           Integument:  Warm, Dry, No erythema, No rash.  Lymphatic:  No lymphadenopathy noted.  Neurologic:  Alert & oriented x 3, Normal motor function, Normal sensory function, No focal deficits noted.  Psychiatric:  Affect normal, Judgment normal, Mood normal.    LABS:   Prior lab work reviewed and notable for:    2025:  TSH 3.44 WNL  CMP with normal renal and hepatic function, no gamma gap  CBC without cytopenias no leukocytosis    24 Outside Labs:   CMP with BUN 16, CR 0.91, LFTs nonelevated, no gamma gap  CBC with normal WBC/Hgb/PLT    24:  MISTY 1: 5120 homogenous with reflex antibodies negative  dsDNA by IFA less than 10 WNL, dsDNA 2.4 WNL  C4 37.8 (high), C3 173.3 (high)  ESR 47 (high), CRP 1.9 (high)    TB testing and acute hepatitis panel negative    2024:  CMP with normal renal function, normal LFTs, no gamma gap  CBC without cytopenias and no leukocytosis  TSH 4.04 WNL    MISTY screen by IFA 1: 1280, nuclear/homogenous  RF less than 14, CCP less than 16  CRP 21.8 (high), ESR 33 (high)    Imagin2024 bilateral hand XR's:    L Hand Impression   CONCLUSION:  1. No acute fracture or dislocation.  2. Moderate arthritic changes involve the 1st carpal-metacarpal joint.  Mild degeneration MCP and interphalangeal joints, with marked degeneration at the DIP joints 3rd  and 4th digits.            R Hand Impression   CONCLUSION:  1. No acute fracture or dislocation.  2. Osteoarthritic changes involving the wrist and hand.     2/20/2024 left knee XR:    Impression   CONCLUSION:  1. No acute fracture or dislocation.  2. Moderate tricompartment osteoarthritis.  Mild joint effusion.       2/20/2024 lumbar spine XR:  Impression   CONCLUSION:  1. Chronic anterior spondylolisthesis L4 relative to L5.  2. S-shaped scoliosis and multilevel spondylosis.  3. No acute fracture or acute malalignment.       2/20/2024 SI joint XR:  Impression   CONCLUSION:  1. No acute fracture dislocation.  2. Mild bilateral SI joint degenerative changes, left greater than right.          1/17/2024 right knee XR:    Impression   CONCLUSION:       Negative for fracture or malalignment.  Moderate joint space loss of the medial compartment with tricompartmental osteophyte formation.  No patellar subluxation.  No joint effusion or focal soft tissue swelling.

## 2025-05-15 ENCOUNTER — PATIENT MESSAGE (OUTPATIENT)
Dept: RHEUMATOLOGY | Facility: CLINIC | Age: 55
End: 2025-05-15

## 2025-05-16 NOTE — TELEPHONE ENCOUNTER
FYI Patient is scheduled for Tuesday at 3pm.You will just have to put the Injection order in EP[facility] that same day when the patient gets there.        Future Appointments   Date Time Provider Department Center   5/20/2025  3:00 PM [facility] CARI 2ND FLR RN RHEUM [facility] [place]   6/2/2025  9:00 AM [facility] INTERN NAPER NURSE [facility]   6/9/2025 11:30 AM Wil Reese MD [facility]

## 2025-05-16 NOTE — TELEPHONE ENCOUNTER
Hello-would be possible for the patient to be scheduled for an RN-only Toradol injection visit next week either Tuesday while I am at [place] or Wednesday when I am at [place]?  If so, would hold other NSAID use at least the day prior and the morning of.  Thank you.

## 2025-05-20 ENCOUNTER — NURSE ONLY (OUTPATIENT)
Dept: RHEUMATOLOGY | Facility: CLINIC | Age: 55
End: 2025-05-20
Payer: COMMERCIAL

## 2025-05-20 DIAGNOSIS — M06.00 SERONEGATIVE RHEUMATOID ARTHRITIS (HCC): Primary | ICD-10-CM

## 2025-05-20 DIAGNOSIS — M25.50 POLYARTHRALGIA: ICD-10-CM

## 2025-05-20 PROCEDURE — 96372 THER/PROPH/DIAG INJ SC/IM: CPT | Performed by: INTERNAL MEDICINE

## 2025-05-20 RX ORDER — KETOROLAC TROMETHAMINE 30 MG/ML
30 INJECTION, SOLUTION INTRAMUSCULAR; INTRAVENOUS ONCE
Status: COMPLETED | OUTPATIENT
Start: 2025-05-20 | End: 2025-05-20

## 2025-05-20 RX ADMIN — KETOROLAC TROMETHAMINE 30 MG: 30 INJECTION, SOLUTION INTRAMUSCULAR; INTRAVENOUS at 15:06:00

## 2025-07-25 NOTE — TELEPHONE ENCOUNTER
Requested Prescriptions     Pending Prescriptions Disp Refills    DICLOFENAC 75 MG Oral Tab EC [Pharmacy Med Name: DICLOFENAC SODIUM 75MG DR TABLETS] 60 tablet 2     Sig: TAKE 1 TABLET BY MOUTH EVERY DAY TO TWICE DAILY     LOV: 5/2/25  Future Appointments   Date Time Provider Department Center   1/12/2026  9:00 [facility]   1/19/2026  9:00 AM [facility]     Labs:   Component      Latest Ref Rng 6/9/2025   WBC      3.8 - 10.8 Thousand/uL 5.7    RBC      3.80 - 5.10 Million/uL 4.74    Hemoglobin      11.7 - 15.5 g/dL 14.2    Hematocrit      35.0 - 45.0 % 43.3    MCV      80.0 - 100.0 fL 91.4    MCH      27.0 - 33.0 pg 30.0    MCHC      32.0 - 36.0 g/dL 32.8    RDW      11.0 - 15.0 % 13.8    Platelet Count      140 - 400 Thousand/uL 260    MPV      7.5 - 12.5 fL 9.7    Neutrophils Absolute      1,500 - 7,800 cells/uL 3,300    Lymphocytes Absolute      850 - 3,900 cells/uL 1,864    Monocytes Absolute      200 - 950 cells/uL 490    Eosinophils Absolute      15 - 500 cells/uL 29    Basophils Absolute      0 - 200 cells/uL 17    Neutrophils %      % 57.9    Lymphocytes %      % 32.7    Monocytes %      % 8.6    Eosinophils %      % 0.5    Basophils %      % 0.3    Glucose      65 - 99 mg/dL 85    BUN      7 - 25 mg/dL 18    CREATININE      0.50 - 1.03 mg/dL 0.63    EGFR      > OR = 60 mL/min/1.73m2 106    BUN/CREATININE RATIO      6 - 22 (calc) SEE NOTE:    Sodium      135 - 146 mmol/L 138    Potassium      3.5 - 5.3 mmol/L 4.4    Chloride      98 - 110 mmol/L 97 (L)    Carbon Dioxide, Total      20 - 32 mmol/L 29    CALCIUM      8.6 - 10.4 mg/dL 9.7    PROTEIN, TOTAL      6.1 - 8.1 g/dL 7.7    Albumin      3.6 - 5.1 g/dL 4.5    Globulin      1.9 - 3.7 g/dL (calc) 3.2    A/G Ratio      1.0 - 2.5 (calc) 1.4    Total Bilirubin      0.2 - 1.2 mg/dL 0.4    Alkaline Phosphatase      37 - 153 U/L 90    AST (SGOT)      10 - 35 U/L 19    ALT (SGPT)      6  - 29 U/L 29       Legend:  (L) Low    PLAN:  -Discontinue Celebrex.  Instead,  Start: Voltaren 50 mg BID with food for 2 weeks. Then, after 2 weeks, change to PRN Voltaren 50 mg BID with food for breakthrough pain.             -Discussed with patient to avoid other OTC NSAIDs while on this medication given increased risk of side effects including GI upset and GI bleed.   -Continue hydroxychloroquine 400 mg daily (less than 5 mg/kg)       -Prev discussed with patient side effects of Plaquenil which include but are not limited to retinopathy skin changes, blood abnormalities, hepatotoxicity, cardiotoxicity, neuro changes such as dizziness, fatigue, irritability, myopathy.  Pending patient response, patient will require annual Plaquenil eye exams if continued on chronic Plaquenil.  -PRN Prednisone 5 mg q AM for breakthrough pain  - Consult/evaluation communicated with referring physician/provider.        -Dr. Ara Hayes Appt 1/6/2025. Rec f/u once yearly.      Patient to contact us in about 2 weeks after trial of Voltaren.  Follow-up depending symptoms.     Abdelrahman Nevarez DO  5/2/2025   2:55 PM

## 2025-07-27 RX ORDER — DICLOFENAC SODIUM 75 MG/1
75 TABLET, DELAYED RELEASE ORAL 2 TIMES DAILY
Qty: 60 TABLET | Refills: 2 | Status: SHIPPED | OUTPATIENT
Start: 2025-07-27

## (undated) NOTE — ED AVS SNAPSHOT
Meredith Lemon   MRN: [id number]    Department:  [facility] Emergency Department   Date of Visit:  4/10/2019           Disclosure     Insurance plans vary and the physician(s) referred by the ER may not be covered by your plan.  Please contact yo tell this physician (or your personal doctor if your instructions are to return to your personal doctor) about any new or lasting problems. The primary care or specialist physician will see patients referred from the [facility] Emergency Department.  Salvadore Skiff